# Patient Record
Sex: MALE | Race: WHITE | Employment: FULL TIME
[De-identification: names, ages, dates, MRNs, and addresses within clinical notes are randomized per-mention and may not be internally consistent; named-entity substitution may affect disease eponyms.]

---

## 2020-08-17 ENCOUNTER — NURSE TRIAGE (OUTPATIENT)
Dept: OTHER | Facility: CLINIC | Age: 40
End: 2020-08-17

## 2020-08-20 ENCOUNTER — OFFICE VISIT (OUTPATIENT)
Dept: FAMILY MEDICINE CLINIC | Age: 40
End: 2020-08-20
Payer: COMMERCIAL

## 2020-08-20 VITALS
RESPIRATION RATE: 28 BRPM | BODY MASS INDEX: 31.83 KG/M2 | WEIGHT: 248 LBS | DIASTOLIC BLOOD PRESSURE: 68 MMHG | SYSTOLIC BLOOD PRESSURE: 122 MMHG | OXYGEN SATURATION: 85 % | TEMPERATURE: 97.1 F | HEART RATE: 88 BPM | HEIGHT: 74 IN

## 2020-08-20 PROCEDURE — G8417 CALC BMI ABV UP PARAM F/U: HCPCS | Performed by: NURSE PRACTITIONER

## 2020-08-20 PROCEDURE — 99204 OFFICE O/P NEW MOD 45 MIN: CPT | Performed by: NURSE PRACTITIONER

## 2020-08-20 PROCEDURE — G8427 DOCREV CUR MEDS BY ELIG CLIN: HCPCS | Performed by: NURSE PRACTITIONER

## 2020-08-20 PROCEDURE — 1036F TOBACCO NON-USER: CPT | Performed by: NURSE PRACTITIONER

## 2020-08-20 RX ORDER — BENZONATATE 200 MG/1
200 CAPSULE ORAL 3 TIMES DAILY PRN
Qty: 30 CAPSULE | Refills: 0 | Status: SHIPPED | OUTPATIENT
Start: 2020-08-20 | End: 2020-08-27

## 2020-08-20 RX ORDER — CETIRIZINE HYDROCHLORIDE 10 MG/1
10 TABLET ORAL DAILY
COMMUNITY

## 2020-08-20 RX ORDER — SERTRALINE HYDROCHLORIDE 25 MG/1
25 TABLET, FILM COATED ORAL DAILY
Status: ON HOLD | COMMUNITY
End: 2020-09-13 | Stop reason: HOSPADM

## 2020-08-20 RX ORDER — ATORVASTATIN CALCIUM 10 MG/1
10 TABLET, FILM COATED ORAL DAILY
COMMUNITY
End: 2020-09-22 | Stop reason: SDUPTHER

## 2020-08-20 RX ORDER — QUETIAPINE FUMARATE 50 MG/1
50 TABLET, FILM COATED ORAL NIGHTLY
COMMUNITY
End: 2022-03-09

## 2020-08-20 RX ORDER — MELATONIN 10 MG
2 CAPSULE ORAL NIGHTLY
COMMUNITY

## 2020-08-20 RX ORDER — PREDNISONE 1 MG/1
TABLET ORAL
Qty: 45 TABLET | Refills: 0 | Status: SHIPPED | OUTPATIENT
Start: 2020-08-20 | End: 2020-08-30

## 2020-08-20 RX ORDER — AZITHROMYCIN 250 MG/1
250 TABLET, FILM COATED ORAL DAILY
COMMUNITY
End: 2020-09-14 | Stop reason: SDUPTHER

## 2020-08-20 RX ORDER — OMEPRAZOLE 40 MG/1
40 CAPSULE, DELAYED RELEASE ORAL DAILY
COMMUNITY
End: 2021-01-08 | Stop reason: SDUPTHER

## 2020-08-20 RX ORDER — ALBUTEROL SULFATE 2.5 MG/3ML
2.5 SOLUTION RESPIRATORY (INHALATION) EVERY 6 HOURS PRN
COMMUNITY
End: 2020-09-14 | Stop reason: SDUPTHER

## 2020-08-20 SDOH — ECONOMIC STABILITY: INCOME INSECURITY: HOW HARD IS IT FOR YOU TO PAY FOR THE VERY BASICS LIKE FOOD, HOUSING, MEDICAL CARE, AND HEATING?: NOT VERY HARD

## 2020-08-20 SDOH — ECONOMIC STABILITY: FOOD INSECURITY: WITHIN THE PAST 12 MONTHS, YOU WORRIED THAT YOUR FOOD WOULD RUN OUT BEFORE YOU GOT MONEY TO BUY MORE.: NEVER TRUE

## 2020-08-20 SDOH — ECONOMIC STABILITY: FOOD INSECURITY: WITHIN THE PAST 12 MONTHS, THE FOOD YOU BOUGHT JUST DIDN'T LAST AND YOU DIDN'T HAVE MONEY TO GET MORE.: NEVER TRUE

## 2020-08-20 ASSESSMENT — ENCOUNTER SYMPTOMS
EYES NEGATIVE: 1
COUGH: 1
SHORTNESS OF BREATH: 1
GASTROINTESTINAL NEGATIVE: 1

## 2020-08-20 ASSESSMENT — PATIENT HEALTH QUESTIONNAIRE - PHQ9
SUM OF ALL RESPONSES TO PHQ QUESTIONS 1-9: 0
SUM OF ALL RESPONSES TO PHQ9 QUESTIONS 1 & 2: 0
2. FEELING DOWN, DEPRESSED OR HOPELESS: 0
SUM OF ALL RESPONSES TO PHQ QUESTIONS 1-9: 0
1. LITTLE INTEREST OR PLEASURE IN DOING THINGS: 0

## 2020-08-20 NOTE — PROGRESS NOTES
Derrick Hernandez is a 38 y.o. male whopresents today for :  Chief Complaint   Patient presents with    New Patient    Breathing Problem    Cough     congested cough x 3 weeks    Sinus Problem       HPI:     HPI  Patient presents as a new patient. Chief complaint is difficulty breathing. History is is that 4 years ago he began having severe sinus problems and at times severe shortness of breath. He has had multiple hospital admissions but no conclusive findings. At one point he was diagnosed with vocal cord dysfunction however treatment of that provided no relief. He will become so short of breath that he has to just lay in bed until he recovers. He spells will typically last for weeks at a time. He did have multiple sinus surgeries at the beginning and reports his sinuses are better. He had a flare in April that required hospitalization. Since he has been short of breath all summer with a recent flare beginning a few weeks ago. Upon entering the room the patient was in obvious distress with a pulse ox of 85 and difficulty even talking. He was placed on 4 L of oxygen and his pulse ox laure to 92%. He felt much better following oxygen. He reports when these flares occur he brings up thick mucus at times. When his sinuses were flared up he reports having excessive sinus mucus. I was able to review records from [place] as previous admissions which revealed revealed prior granulomatous disease. He is on a [place] at this time but denies fever. He has had no recent COVID exposure. The symptoms are consistent with the spells he has had over the past 4 years. There is no problem list on file for this patient.        Past Medical History:   Diagnosis Date    Anxiety     Chronic rhinosinusitis     Depression     Hypercholesteremia     Vocal cord dysfunction       Past Surgical History:   Procedure Laterality Date    SINUS SURGERY  12/01/2016     Family History   Problem Relation Age of Onset    No Review of Systems   Constitutional: Positive for fatigue. HENT: Negative. Eyes: Negative. Respiratory: Positive for cough and shortness of breath. Cardiovascular: Negative. Gastrointestinal: Negative. Musculoskeletal: Negative. Skin: Negative. Neurological: Negative. Objective:     Vitals:    08/20/20 1551   BP: 122/68   Site: Right Upper Arm   Position: Sitting   Cuff Size: Large Adult   Pulse: 88   Resp: 28   Temp: 97.1 °F (36.2 °C)   TempSrc: Temporal   SpO2: (!) 85%   Weight: 248 lb (112.5 kg)   Height: 6' 1.5\" (1.867 m)       Physical Exam  Constitutional:       General: He is in acute distress. Appearance: He is well-developed. HENT:      Head: Normocephalic. Right Ear: Tympanic membrane and external ear normal.      Left Ear: Tympanic membrane and external ear normal.      Nose: Nose normal.   Neck:      Musculoskeletal: Normal range of motion and neck supple. Cardiovascular:      Rate and Rhythm: Normal rate and regular rhythm. Heart sounds: Normal heart sounds. No murmur. No friction rub. No gallop. Pulmonary:      Effort: Tachypnea, prolonged expiration and retractions present. Breath sounds: Transmitted upper airway sounds (audible upper airway wheezing) present. No wheezing or rales. Abdominal:      General: Bowel sounds are normal.      Palpations: Abdomen is soft. Tenderness: There is no abdominal tenderness. There is no guarding. Musculoskeletal: Normal range of motion. Lymphadenopathy:      Cervical: No cervical adenopathy. Skin:     General: Skin is warm. Neurological:      Mental Status: He is alert and oriented to person, place, and time. Deep Tendon Reflexes: Reflexes are normal and symmetric. Assessment:      Diagnosis Orders   1.  Bronchial stenosis  predniSONE (DELTASONE) 5 MG tablet    benzonatate (TESSALON) 200 MG capsule    CT CHEST WO CONTRAST    External Referral To Pulmonology       Plan:      No follow-ups on file. Orders Placed This Encounter   Procedures    CT CHEST WO CONTRAST     Standing Status:   Future     Standing Expiration Date:   2021    External Referral To Pulmonology     Referral Priority:   Routine     Referral Type:   Eval and Treat     Referral Reason:   Specialty Services Required     Requested Specialty:   Pulmonology     Number of Visits Requested:   1     Orders Placed This Encounter   Medications    predniSONE (DELTASONE) 5 MG tablet     Si tabs po day 1 taper by 5mg daily     Dispense:  45 tablet     Refill:  0    benzonatate (TESSALON) 200 MG capsule     Sig: Take 1 capsule by mouth 3 times daily as needed for Cough     Dispense:  30 capsule     Refill:  0      Very interesting patient. First will stabilize patient and order O2. Pt was 85% on room air 92% on 4 liters. Will start steroid burst and cont zpak  Will refer to [facility] as I suspect pt has wegeners granulomatosis. Will get ct of chest  Will make sure o2 delivered this evening     Patient given educational materials - seepatient instructions. Discussed use, benefit, and side effects of prescribed medications. All patient questions answered. Pt voiced understanding. Patient agreed withtreatment plan. Follow up as directed.      Electronically signed by JOHN España CNP on 2020 at 4:45 PM

## 2020-08-21 ENCOUNTER — TELEPHONE (OUTPATIENT)
Dept: FAMILY MEDICINE CLINIC | Age: 40
End: 2020-08-21

## 2020-08-21 NOTE — TELEPHONE ENCOUNTER
Myles Murphy from [facility] called stating pt refused the oxygen they wanted to bring to him last night. Myles Murphy thought it was maybe due to insurance purposes. She said she was going to call him again today to check in on him to see how he is doing and to see if she could convince him again for the oxygen.    Please advise

## 2020-08-21 NOTE — TELEPHONE ENCOUNTER
Ok, I called and spoke to pt. He was told since he is on an antibiotic the insurance will not cover.   He is going to see how he does without it

## 2020-08-31 ENCOUNTER — TELEPHONE (OUTPATIENT)
Dept: FAMILY MEDICINE CLINIC | Age: 40
End: 2020-08-31

## 2020-08-31 NOTE — TELEPHONE ENCOUNTER
Patients wife called asking about referral to pulmonology at [address] [place] [address]. Per CC patient has to have the CT done prior to being scheduled there. Advised patients wife, will work on Oleg Rothman.

## 2020-09-04 NOTE — TELEPHONE ENCOUNTER
Left detailed message for patient stating he is scheduled at [facility] on 9/8/20 arrival of 0745.

## 2020-09-08 ENCOUNTER — HOSPITAL ENCOUNTER (OUTPATIENT)
Dept: CT IMAGING | Age: 40
Discharge: HOME | DRG: 208 | End: 2020-09-08
Payer: COMMERCIAL

## 2020-09-08 PROCEDURE — 71250 CT THORAX DX C-: CPT

## 2020-09-10 ENCOUNTER — HOSPITAL ENCOUNTER (INPATIENT)
Age: 40
LOS: 2 days | Discharge: HOME | DRG: 208 | End: 2020-09-13
Attending: EMERGENCY MEDICINE | Admitting: INTERNAL MEDICINE
Payer: COMMERCIAL

## 2020-09-10 ENCOUNTER — APPOINTMENT (OUTPATIENT)
Dept: GENERAL RADIOLOGY | Age: 40
DRG: 208 | End: 2020-09-10
Payer: COMMERCIAL

## 2020-09-10 LAB
APTT: 33.1 SECONDS (ref 22–38)
BASE EXCESS (CALCULATED): -4 MMOL/L (ref -2.5–2.5)
COLLECTED BY:: ABNORMAL
EKG ATRIAL RATE: 106 BPM
EKG P AXIS: 67 DEGREES
EKG P-R INTERVAL: 140 MS
EKG Q-T INTERVAL: 398 MS
EKG QRS DURATION: 84 MS
EKG QTC CALCULATION (BAZETT): 528 MS
EKG R AXIS: 13 DEGREES
EKG T AXIS: 57 DEGREES
EKG VENTRICULAR RATE: 106 BPM
HCO3: 34 MMOL/L (ref 23–28)
INR BLD: 0.97 (ref 0.85–1.13)
O2 SATURATION: 97 %
PCO2: 138 MMHG (ref 35–45)
PH BLOOD GAS: 6.99 (ref 7.35–7.45)
PO2: 153 MMHG (ref 71–104)

## 2020-09-10 PROCEDURE — 94660 CPAP INITIATION&MGMT: CPT

## 2020-09-10 PROCEDURE — 82803 BLOOD GASES ANY COMBINATION: CPT

## 2020-09-10 PROCEDURE — 94644 CONT INHLJ TX 1ST HOUR: CPT

## 2020-09-10 PROCEDURE — 6360000002 HC RX W HCPCS: Performed by: EMERGENCY MEDICINE

## 2020-09-10 PROCEDURE — 36600 WITHDRAWAL OF ARTERIAL BLOOD: CPT

## 2020-09-10 PROCEDURE — 2700000000 HC OXYGEN THERAPY PER DAY

## 2020-09-10 PROCEDURE — 80053 COMPREHEN METABOLIC PANEL: CPT

## 2020-09-10 PROCEDURE — 83735 ASSAY OF MAGNESIUM: CPT

## 2020-09-10 PROCEDURE — 83880 ASSAY OF NATRIURETIC PEPTIDE: CPT

## 2020-09-10 PROCEDURE — 0BH18EZ INSERTION OF ENDOTRACHEAL AIRWAY INTO TRACHEA, VIA NATURAL OR ARTIFICIAL OPENING ENDOSCOPIC: ICD-10-PCS | Performed by: EMERGENCY MEDICINE

## 2020-09-10 PROCEDURE — 84484 ASSAY OF TROPONIN QUANT: CPT

## 2020-09-10 PROCEDURE — 85025 COMPLETE CBC W/AUTO DIFF WBC: CPT

## 2020-09-10 PROCEDURE — 36556 INSERT NON-TUNNEL CV CATH: CPT

## 2020-09-10 PROCEDURE — 36415 COLL VENOUS BLD VENIPUNCTURE: CPT

## 2020-09-10 PROCEDURE — 84145 PROCALCITONIN (PCT): CPT

## 2020-09-10 PROCEDURE — 85610 PROTHROMBIN TIME: CPT

## 2020-09-10 PROCEDURE — 02HV33Z INSERTION OF INFUSION DEVICE INTO SUPERIOR VENA CAVA, PERCUTANEOUS APPROACH: ICD-10-PCS | Performed by: EMERGENCY MEDICINE

## 2020-09-10 PROCEDURE — 85730 THROMBOPLASTIN TIME PARTIAL: CPT

## 2020-09-10 PROCEDURE — 71045 X-RAY EXAM CHEST 1 VIEW: CPT

## 2020-09-10 PROCEDURE — 94761 N-INVAS EAR/PLS OXIMETRY MLT: CPT

## 2020-09-10 PROCEDURE — 99285 EMERGENCY DEPT VISIT HI MDM: CPT

## 2020-09-10 PROCEDURE — 93005 ELECTROCARDIOGRAM TRACING: CPT | Performed by: EMERGENCY MEDICINE

## 2020-09-10 PROCEDURE — 5A1935Z RESPIRATORY VENTILATION, LESS THAN 24 CONSECUTIVE HOURS: ICD-10-PCS | Performed by: EMERGENCY MEDICINE

## 2020-09-10 RX ADMIN — ALBUTEROL SULFATE 0.5 MG/KG/HR: 2.5 SOLUTION RESPIRATORY (INHALATION) at 23:46

## 2020-09-11 ENCOUNTER — TELEPHONE (OUTPATIENT)
Dept: FAMILY MEDICINE CLINIC | Age: 40
End: 2020-09-11

## 2020-09-11 ENCOUNTER — APPOINTMENT (OUTPATIENT)
Dept: GENERAL RADIOLOGY | Age: 40
DRG: 208 | End: 2020-09-11
Payer: COMMERCIAL

## 2020-09-11 PROBLEM — J96.90 RESPIRATORY FAILURE (HCC): Status: ACTIVE | Noted: 2020-09-11

## 2020-09-11 LAB
ACINETOBACTER CALCOACETICUS-BAUMANNII BY PCR: NOT DETECTED
ADENOVIRUS BY PCR: NOT DETECTED
ALBUMIN SERPL-MCNC: 4.4 G/DL (ref 3.5–5.1)
ALLEN TEST: POSITIVE
ALP BLD-CCNC: 98 U/L (ref 38–126)
ALT SERPL-CCNC: 18 U/L (ref 11–66)
AMPHETAMINE+METHAMPHETAMINE URINE SCREEN: NEGATIVE
ANION GAP SERPL CALCULATED.3IONS-SCNC: 10 MEQ/L (ref 8–16)
ANION GAP SERPL CALCULATED.3IONS-SCNC: 14 MEQ/L (ref 8–16)
AST SERPL-CCNC: 22 U/L (ref 5–40)
ATYPICAL LYMPHOCYTES: ABNORMAL %
BACTERIA: ABNORMAL /HPF
BAL CHARACTER: ABNORMAL
BAL COLLECTION SITE: ABNORMAL
BAL COLOR: COLORLESS
BARBITURATE QUANTITATIVE URINE: NEGATIVE
BASE EXCESS (CALCULATED): -0.7 MMOL/L (ref -2.5–2.5)
BASOPHILS # BLD: 0.2 %
BASOPHILS # BLD: 0.8 %
BASOPHILS ABSOLUTE: 0 THOU/MM3 (ref 0–0.1)
BASOPHILS ABSOLUTE: 0.1 THOU/MM3 (ref 0–0.1)
BENZODIAZEPINE QUANTITATIVE URINE: POSITIVE
BILIRUB SERPL-MCNC: 0.4 MG/DL (ref 0.3–1.2)
BILIRUBIN URINE: NEGATIVE
BLOOD, URINE: NEGATIVE
BUN BLDV-MCNC: 18 MG/DL (ref 7–22)
BUN BLDV-MCNC: 19 MG/DL (ref 7–22)
C-REACTIVE PROTEIN: 1.6 MG/DL (ref 0–1)
CALCIUM SERPL-MCNC: 8.4 MG/DL (ref 8.5–10.5)
CALCIUM SERPL-MCNC: 9.5 MG/DL (ref 8.5–10.5)
CANNABINOID QUANTITATIVE URINE: POSITIVE
CASTS 2: ABNORMAL /LPF
CASTS UA: ABNORMAL /LPF
CHARACTER, URINE: CLEAR
CHLAMYDIA PNEUMONIAE BY PCR: NOT DETECTED
CHLORIDE BLD-SCNC: 101 MEQ/L (ref 98–111)
CHLORIDE BLD-SCNC: 106 MEQ/L (ref 98–111)
CO2: 25 MEQ/L (ref 23–33)
CO2: 25 MEQ/L (ref 23–33)
COCAINE METABOLITE QUANTITATIVE URINE: NEGATIVE
COLLECTED BY:: ABNORMAL
COLOR: YELLOW
CORONAVIRUS PCR: NOT DETECTED
CREAT SERPL-MCNC: 1.3 MG/DL (ref 0.4–1.2)
CREAT SERPL-MCNC: 1.3 MG/DL (ref 0.4–1.2)
CREAT SERPL-MCNC: 1.4 MG/DL (ref 0.4–1.2)
CRYSTALS, UA: ABNORMAL
DEVICE: ABNORMAL
ENTEROBACTER CLOACAE COMPLEX BY PCR: NOT DETECTED
EOSINOPHIL # BLD: 0.3 %
EOSINOPHIL # BLD: 8 %
EOSINOPHILS ABSOLUTE: 0 THOU/MM3 (ref 0–0.4)
EOSINOPHILS ABSOLUTE: 1.3 THOU/MM3 (ref 0–0.4)
EOSINOPHILS BAL: 17 % (ref 0–1)
EPITHELIAL CELLS, UA: ABNORMAL /HPF
ERYTHROCYTE [DISTWIDTH] IN BLOOD BY AUTOMATED COUNT: 14 % (ref 11.5–14.5)
ERYTHROCYTE [DISTWIDTH] IN BLOOD BY AUTOMATED COUNT: 14.7 % (ref 11.5–14.5)
ERYTHROCYTE [DISTWIDTH] IN BLOOD BY AUTOMATED COUNT: 40.3 FL (ref 35–45)
ERYTHROCYTE [DISTWIDTH] IN BLOOD BY AUTOMATED COUNT: 41.9 FL (ref 35–45)
ESCHERICHIA COLI BY PCR: NOT DETECTED
GFR SERPL CREATININE-BSD FRML MDRD: 56 ML/MIN/1.73M2
GFR SERPL CREATININE-BSD FRML MDRD: 61 ML/MIN/1.73M2
GFR SERPL CREATININE-BSD FRML MDRD: 61 ML/MIN/1.73M2
GLUCOSE BLD-MCNC: 116 MG/DL (ref 70–108)
GLUCOSE BLD-MCNC: 131 MG/DL (ref 70–108)
GLUCOSE BLD-MCNC: 135 MG/DL (ref 70–108)
GLUCOSE BLD-MCNC: 152 MG/DL (ref 70–108)
GLUCOSE BLD-MCNC: 159 MG/DL (ref 70–108)
GLUCOSE BLD-MCNC: 189 MG/DL (ref 70–108)
GLUCOSE BLD-MCNC: 219 MG/DL (ref 70–108)
GLUCOSE URINE: NEGATIVE MG/DL
HAEMOPHILUS INFLUENZAE BY PCR: NOT DETECTED
HCO3: 28 MMOL/L (ref 23–28)
HCT VFR BLD CALC: 45.9 % (ref 42–52)
HCT VFR BLD CALC: 53.3 % (ref 42–52)
HEMOGLOBIN: 14.4 GM/DL (ref 14–18)
HEMOGLOBIN: 16.7 GM/DL (ref 14–18)
IFIO2: 50
IGA: 146 MG/DL (ref 70–400)
IGE: 21 IU/ML
IGG: 947 MG/DL (ref 700–1600)
IGM: 93 MG/DL (ref 40–230)
IMMATURE GRANS (ABS): 0.1 THOU/MM3 (ref 0–0.07)
IMMATURE GRANS (ABS): 0.16 THOU/MM3 (ref 0–0.07)
IMMATURE GRANULOCYTES: 0.6 %
IMMATURE GRANULOCYTES: 1.3 %
INFLUENZA A BY PCR: NOT DETECTED
INFLUENZA B BY PCR: NOT DETECTED
KETONES, URINE: NEGATIVE
KLEBSIELLA AEROGENES BY PCR: NOT DETECTED
KLEBSIELLA OXYTOCA BY PCR: NOT DETECTED
KLEBSIELLA PNEUMONIAE GROUP BY PCR: NOT DETECTED
LACTIC ACID: 1 MMOL/L (ref 0.5–2.2)
LEGIONELLA PNEUMOPHILIA BY PCR: NOT DETECTED
LEUKOCYTE ESTERASE, URINE: NEGATIVE
LYMPHOCYTES # BLD: 3.4 %
LYMPHOCYTES # BLD: 33.7 %
LYMPHOCYTES ABSOLUTE: 0.4 THOU/MM3 (ref 1–4.8)
LYMPHOCYTES ABSOLUTE: 5.7 THOU/MM3 (ref 1–4.8)
LYMPHOCYTES, BAL: 15 % (ref 10–15)
MACROPHAGE/MONOCYTE BAL: 2 % (ref 86–100)
MAGNESIUM: 2.2 MG/DL (ref 1.6–2.4)
MAGNESIUM: 2.2 MG/DL (ref 1.6–2.4)
MCH RBC QN AUTO: 25.9 PG (ref 26–33)
MCH RBC QN AUTO: 26.3 PG (ref 26–33)
MCHC RBC AUTO-ENTMCNC: 31.3 GM/DL (ref 32.2–35.5)
MCHC RBC AUTO-ENTMCNC: 31.4 GM/DL (ref 32.2–35.5)
MCV RBC AUTO: 82.7 FL (ref 80–94)
MCV RBC AUTO: 84.1 FL (ref 80–94)
METAPNEUMOVIRUS BY PCR: NOT DETECTED
MISCELLANEOUS 2: ABNORMAL
MODE: ABNORMAL
MONOCYTES # BLD: 1.7 %
MONOCYTES # BLD: 6 %
MONOCYTES ABSOLUTE: 0.2 THOU/MM3 (ref 0.4–1.3)
MONOCYTES ABSOLUTE: 1 THOU/MM3 (ref 0.4–1.3)
MORAXELLA CATARRHALIS BY PCR: NOT DETECTED
MRSA SCREEN RT-PCR: NEGATIVE
MYCOPLASMA PNEUMONIAE BY PCR: NOT DETECTED
NITRITE, URINE: NEGATIVE
NUCLEATED RED BLOOD CELLS: 0 /100 WBC
NUCLEATED RED BLOOD CELLS: 0 /100 WBC
O2 SATURATION: 88 %
OPIATES, URINE: NEGATIVE
OSMOLALITY CALCULATION: 288 MOSMOL/KG (ref 275–300)
OXYCODONE: NEGATIVE
PARAINFLUENZA VIRUS BY PCR: NOT DETECTED
PATHOLOGIST REVIEW: ABNORMAL
PCO2: 59 MMHG (ref 35–45)
PH BLOOD GAS: 7.28 (ref 7.35–7.45)
PH UA: 5 (ref 5–9)
PHENCYCLIDINE QUANTITATIVE URINE: NEGATIVE
PIP: 35 CMH2O
PLATELET # BLD: 174 THOU/MM3 (ref 130–400)
PLATELET # BLD: 273 THOU/MM3 (ref 130–400)
PLATELET ESTIMATE: ADEQUATE
PMV BLD AUTO: 11.8 FL (ref 9.4–12.4)
PMV BLD AUTO: 11.9 FL (ref 9.4–12.4)
PO2: 63 MMHG (ref 71–104)
POTASSIUM REFLEX MAGNESIUM: 4.1 MEQ/L (ref 3.5–5.2)
POTASSIUM SERPL-SCNC: 4.2 MEQ/L (ref 3.5–5.2)
PRO-BNP: 88.4 PG/ML (ref 0–450)
PROCALCITONIN: 0.04 NG/ML (ref 0.01–0.09)
PROTEIN UA: 30
PROTEUS SPECIES BY PCR: NOT DETECTED
PSEUDOMONAS AERUGINOSA BY PCR: NOT DETECTED
RBC # BLD: 5.55 MILL/MM3 (ref 4.7–6.1)
RBC # BLD: 6.34 MILL/MM3 (ref 4.7–6.1)
RBC BAL: 43 /CUMM
RBC URINE: ABNORMAL /HPF
RENAL EPITHELIAL, UA: ABNORMAL
RESISTANT GENE CTX-M BY PCR: ABNORMAL
RESISTANT GENE IMP BY PCR: ABNORMAL
RESISTANT GENE KPC BY PCR: ABNORMAL
RESISTANT GENE MECA/C & MREJ BY PCR: NOT DETECTED
RESISTANT GENE NDM BY PCR: ABNORMAL
RESISTANT GENE OXA-48-LIKE BY PCR: ABNORMAL
RESISTANT GENE VIM BY PCR: ABNORMAL
RESPIRATORY SYNCYTIAL VIRUS BY PCR: NOT DETECTED
RHINOVIRUS ENTEROVIRUS PCR: NOT DETECTED
SARS-COV-2, NAAT: NOT DETECTED
SCAN OF BLOOD SMEAR: NORMAL
SEDIMENTATION RATE, ERYTHROCYTE: 8 MM/HR (ref 0–10)
SEG NEUTROPHILS: 50.9 %
SEG NEUTROPHILS: 93.1 %
SEGMENTED NEUTROPHILS ABSOLUTE COUNT: 11.5 THOU/MM3 (ref 1.8–7.7)
SEGMENTED NEUTROPHILS ABSOLUTE COUNT: 8.6 THOU/MM3 (ref 1.8–7.7)
SEGMENTED NEUTROPHILS, BAL: 66 % (ref 0–3)
SERRATIA MARCESCENS BY PCR: NOT DETECTED
SET PEEP: 6 MMHG
SET RESPIRATORY RATE: 20 BPM
SODIUM BLD-SCNC: 140 MEQ/L (ref 135–145)
SODIUM BLD-SCNC: 141 MEQ/L (ref 135–145)
SOURCE, BLOOD GAS: ABNORMAL
SOURCE: ABNORMAL
SPECIFIC GRAVITY, URINE: 1.02 (ref 1–1.03)
SPECIMEN ACCEPTABILITY: ABNORMAL
STAPH AUREUS BY PCR: DETECTED
STREP AGALACTIAE BY PCR: NOT DETECTED
STREP PNEUMONIAE BY PCR: NOT DETECTED
STREP PYOGENES BY PCR: NOT DETECTED
TOTAL NUCLEATED CELLS BAL: 653 /CUMM
TOTAL PROTEIN: 7.5 G/DL (ref 6.1–8)
TOTAL VOLUME RECEIVED BAL: 3 ML
TROPONIN T: < 0.01 NG/ML
TSH SERPL DL<=0.05 MIU/L-ACNC: 0.59 UIU/ML (ref 0.4–4.2)
UROBILINOGEN, URINE: 0.2 EU/DL (ref 0–1)
VANCOMYCIN RESISTANT ENTEROCOCCUS: NEGATIVE
WBC # BLD: 12.3 THOU/MM3 (ref 4.8–10.8)
WBC # BLD: 16.8 THOU/MM3 (ref 4.8–10.8)
WBC UA: ABNORMAL /HPF
YEAST: ABNORMAL

## 2020-09-11 PROCEDURE — APPSS180 APP SPLIT SHARED TIME > 60 MINUTES: Performed by: NURSE PRACTITIONER

## 2020-09-11 PROCEDURE — 71045 X-RAY EXAM CHEST 1 VIEW: CPT

## 2020-09-11 PROCEDURE — 36415 COLL VENOUS BLD VENIPUNCTURE: CPT

## 2020-09-11 PROCEDURE — 2580000003 HC RX 258: Performed by: NURSE PRACTITIONER

## 2020-09-11 PROCEDURE — 6370000000 HC RX 637 (ALT 250 FOR IP): Performed by: NURSE PRACTITIONER

## 2020-09-11 PROCEDURE — 2500000003 HC RX 250 WO HCPCS: Performed by: EMERGENCY MEDICINE

## 2020-09-11 PROCEDURE — 2709999900 HC NON-CHARGEABLE SUPPLY

## 2020-09-11 PROCEDURE — 99291 CRITICAL CARE FIRST HOUR: CPT | Performed by: INTERNAL MEDICINE

## 2020-09-11 PROCEDURE — 2500000003 HC RX 250 WO HCPCS: Performed by: INTERNAL MEDICINE

## 2020-09-11 PROCEDURE — 6360000002 HC RX W HCPCS: Performed by: EMERGENCY MEDICINE

## 2020-09-11 PROCEDURE — 82565 ASSAY OF CREATININE: CPT

## 2020-09-11 PROCEDURE — 31500 INSERT EMERGENCY AIRWAY: CPT

## 2020-09-11 PROCEDURE — 86255 FLUORESCENT ANTIBODY SCREEN: CPT

## 2020-09-11 PROCEDURE — 87077 CULTURE AEROBIC IDENTIFY: CPT

## 2020-09-11 PROCEDURE — 86038 ANTINUCLEAR ANTIBODIES: CPT

## 2020-09-11 PROCEDURE — 87631 RESP VIRUS 3-5 TARGETS: CPT

## 2020-09-11 PROCEDURE — 87205 SMEAR GRAM STAIN: CPT

## 2020-09-11 PROCEDURE — 83605 ASSAY OF LACTIC ACID: CPT

## 2020-09-11 PROCEDURE — 86140 C-REACTIVE PROTEIN: CPT

## 2020-09-11 PROCEDURE — 94640 AIRWAY INHALATION TREATMENT: CPT

## 2020-09-11 PROCEDURE — 94002 VENT MGMT INPAT INIT DAY: CPT

## 2020-09-11 PROCEDURE — 82784 ASSAY IGA/IGD/IGG/IGM EACH: CPT

## 2020-09-11 PROCEDURE — 82785 ASSAY OF IGE: CPT

## 2020-09-11 PROCEDURE — 87070 CULTURE OTHR SPECIMN AEROBIC: CPT

## 2020-09-11 PROCEDURE — 87486 CHLMYD PNEUM DNA AMP PROBE: CPT

## 2020-09-11 PROCEDURE — 2500000003 HC RX 250 WO HCPCS: Performed by: NURSE PRACTITIONER

## 2020-09-11 PROCEDURE — 86162 COMPLEMENT TOTAL (CH50): CPT

## 2020-09-11 PROCEDURE — 81001 URINALYSIS AUTO W/SCOPE: CPT

## 2020-09-11 PROCEDURE — 80307 DRUG TEST PRSMV CHEM ANLYZR: CPT

## 2020-09-11 PROCEDURE — 6360000002 HC RX W HCPCS: Performed by: INTERNAL MEDICINE

## 2020-09-11 PROCEDURE — 85651 RBC SED RATE NONAUTOMATED: CPT

## 2020-09-11 PROCEDURE — 87081 CULTURE SCREEN ONLY: CPT

## 2020-09-11 PROCEDURE — 80048 BASIC METABOLIC PNL TOTAL CA: CPT

## 2020-09-11 PROCEDURE — 87541 LEGION PNEUMO DNA AMP PROB: CPT

## 2020-09-11 PROCEDURE — 82803 BLOOD GASES ANY COMBINATION: CPT

## 2020-09-11 PROCEDURE — 6370000000 HC RX 637 (ALT 250 FOR IP): Performed by: INTERNAL MEDICINE

## 2020-09-11 PROCEDURE — 87581 M.PNEUMON DNA AMP PROBE: CPT

## 2020-09-11 PROCEDURE — 2580000003 HC RX 258: Performed by: INTERNAL MEDICINE

## 2020-09-11 PROCEDURE — 87040 BLOOD CULTURE FOR BACTERIA: CPT

## 2020-09-11 PROCEDURE — 89220 SPUTUM SPECIMEN COLLECTION: CPT

## 2020-09-11 PROCEDURE — 87500 VANOMYCIN DNA AMP PROBE: CPT

## 2020-09-11 PROCEDURE — 6360000002 HC RX W HCPCS

## 2020-09-11 PROCEDURE — U0002 COVID-19 LAB TEST NON-CDC: HCPCS

## 2020-09-11 PROCEDURE — 2700000000 HC OXYGEN THERAPY PER DAY

## 2020-09-11 PROCEDURE — 36592 COLLECT BLOOD FROM PICC: CPT

## 2020-09-11 PROCEDURE — 85025 COMPLETE CBC W/AUTO DIFF WBC: CPT

## 2020-09-11 PROCEDURE — 36600 WITHDRAWAL OF ARTERIAL BLOOD: CPT

## 2020-09-11 PROCEDURE — 87150 DNA/RNA AMPLIFIED PROBE: CPT

## 2020-09-11 PROCEDURE — 6360000002 HC RX W HCPCS: Performed by: NURSE PRACTITIONER

## 2020-09-11 PROCEDURE — 94761 N-INVAS EAR/PLS OXIMETRY MLT: CPT

## 2020-09-11 PROCEDURE — 87147 CULTURE TYPE IMMUNOLOGIC: CPT

## 2020-09-11 PROCEDURE — 93010 ELECTROCARDIOGRAM REPORT: CPT | Performed by: NUCLEAR MEDICINE

## 2020-09-11 PROCEDURE — 84443 ASSAY THYROID STIM HORMONE: CPT

## 2020-09-11 PROCEDURE — 87641 MR-STAPH DNA AMP PROBE: CPT

## 2020-09-11 PROCEDURE — 87086 URINE CULTURE/COLONY COUNT: CPT

## 2020-09-11 PROCEDURE — 2580000003 HC RX 258: Performed by: EMERGENCY MEDICINE

## 2020-09-11 PROCEDURE — 83735 ASSAY OF MAGNESIUM: CPT

## 2020-09-11 PROCEDURE — 82948 REAGENT STRIP/BLOOD GLUCOSE: CPT

## 2020-09-11 PROCEDURE — 89051 BODY FLUID CELL COUNT: CPT

## 2020-09-11 PROCEDURE — 94010 BREATHING CAPACITY TEST: CPT

## 2020-09-11 PROCEDURE — 86160 COMPLEMENT ANTIGEN: CPT

## 2020-09-11 PROCEDURE — 87798 DETECT AGENT NOS DNA AMP: CPT

## 2020-09-11 PROCEDURE — 2060000000 HC ICU INTERMEDIATE R&B

## 2020-09-11 PROCEDURE — 87186 SC STD MICRODIL/AGAR DIL: CPT

## 2020-09-11 RX ORDER — PROPOFOL 10 MG/ML
10 INJECTION, EMULSION INTRAVENOUS
Status: DISCONTINUED | OUTPATIENT
Start: 2020-09-11 | End: 2020-09-11

## 2020-09-11 RX ORDER — DEXTROSE MONOHYDRATE 25 G/50ML
12.5 INJECTION, SOLUTION INTRAVENOUS PRN
Status: DISCONTINUED | OUTPATIENT
Start: 2020-09-11 | End: 2020-09-12

## 2020-09-11 RX ORDER — NICOTINE POLACRILEX 4 MG
15 LOZENGE BUCCAL PRN
Status: DISCONTINUED | OUTPATIENT
Start: 2020-09-11 | End: 2020-09-12

## 2020-09-11 RX ORDER — ONDANSETRON 2 MG/ML
4 INJECTION INTRAMUSCULAR; INTRAVENOUS EVERY 6 HOURS PRN
Status: DISCONTINUED | OUTPATIENT
Start: 2020-09-11 | End: 2020-09-13 | Stop reason: HOSPADM

## 2020-09-11 RX ORDER — SODIUM CHLORIDE 0.9 % (FLUSH) 0.9 %
10 SYRINGE (ML) INJECTION EVERY 12 HOURS SCHEDULED
Status: DISCONTINUED | OUTPATIENT
Start: 2020-09-11 | End: 2020-09-13 | Stop reason: HOSPADM

## 2020-09-11 RX ORDER — ATORVASTATIN CALCIUM 10 MG/1
10 TABLET, FILM COATED ORAL DAILY
Status: DISCONTINUED | OUTPATIENT
Start: 2020-09-11 | End: 2020-09-13 | Stop reason: HOSPADM

## 2020-09-11 RX ORDER — DEXAMETHASONE SODIUM PHOSPHATE 4 MG/ML
6 INJECTION, SOLUTION INTRA-ARTICULAR; INTRALESIONAL; INTRAMUSCULAR; INTRAVENOUS; SOFT TISSUE DAILY
Status: DISCONTINUED | OUTPATIENT
Start: 2020-09-11 | End: 2020-09-12

## 2020-09-11 RX ORDER — DEXTROSE MONOHYDRATE 50 MG/ML
100 INJECTION, SOLUTION INTRAVENOUS PRN
Status: DISCONTINUED | OUTPATIENT
Start: 2020-09-11 | End: 2020-09-12

## 2020-09-11 RX ORDER — ACETAMINOPHEN 650 MG/1
650 SUPPOSITORY RECTAL EVERY 6 HOURS PRN
Status: DISCONTINUED | OUTPATIENT
Start: 2020-09-11 | End: 2020-09-13 | Stop reason: HOSPADM

## 2020-09-11 RX ORDER — METHYLPREDNISOLONE SODIUM SUCCINATE 125 MG/2ML
125 INJECTION, POWDER, LYOPHILIZED, FOR SOLUTION INTRAMUSCULAR; INTRAVENOUS ONCE
Status: COMPLETED | OUTPATIENT
Start: 2020-09-11 | End: 2020-09-11

## 2020-09-11 RX ORDER — PROPOFOL 10 MG/ML
10 INJECTION, EMULSION INTRAVENOUS ONCE
Status: COMPLETED | OUTPATIENT
Start: 2020-09-11 | End: 2020-09-11

## 2020-09-11 RX ORDER — KETAMINE HCL IN NACL, ISO-OSM 100MG/10ML
SYRINGE (ML) INJECTION
Status: DISCONTINUED
Start: 2020-09-11 | End: 2020-09-11

## 2020-09-11 RX ORDER — PROPOFOL 10 MG/ML
INJECTION, EMULSION INTRAVENOUS
Status: DISCONTINUED
Start: 2020-09-11 | End: 2020-09-11

## 2020-09-11 RX ORDER — DEXAMETHASONE SODIUM PHOSPHATE 4 MG/ML
4 INJECTION, SOLUTION INTRA-ARTICULAR; INTRALESIONAL; INTRAMUSCULAR; INTRAVENOUS; SOFT TISSUE DAILY
Status: DISCONTINUED | OUTPATIENT
Start: 2020-09-11 | End: 2020-09-11

## 2020-09-11 RX ORDER — VECURONIUM BROMIDE 1 MG/ML
INJECTION, POWDER, LYOPHILIZED, FOR SOLUTION INTRAVENOUS DAILY PRN
Status: COMPLETED | OUTPATIENT
Start: 2020-09-11 | End: 2020-09-11

## 2020-09-11 RX ORDER — PROMETHAZINE HYDROCHLORIDE 25 MG/1
12.5 TABLET ORAL EVERY 6 HOURS PRN
Status: DISCONTINUED | OUTPATIENT
Start: 2020-09-11 | End: 2020-09-13 | Stop reason: HOSPADM

## 2020-09-11 RX ORDER — KETAMINE HCL IN NACL, ISO-OSM 100MG/10ML
SYRINGE (ML) INJECTION DAILY PRN
Status: COMPLETED | OUTPATIENT
Start: 2020-09-11 | End: 2020-09-11

## 2020-09-11 RX ORDER — BUDESONIDE AND FORMOTEROL FUMARATE DIHYDRATE 160; 4.5 UG/1; UG/1
2 AEROSOL RESPIRATORY (INHALATION) 2 TIMES DAILY
Status: DISCONTINUED | OUTPATIENT
Start: 2020-09-11 | End: 2020-09-13 | Stop reason: HOSPADM

## 2020-09-11 RX ORDER — QUETIAPINE FUMARATE 25 MG/1
50 TABLET, FILM COATED ORAL NIGHTLY
Status: DISCONTINUED | OUTPATIENT
Start: 2020-09-11 | End: 2020-09-13 | Stop reason: HOSPADM

## 2020-09-11 RX ORDER — CETIRIZINE HYDROCHLORIDE 10 MG/1
10 TABLET ORAL DAILY
Status: DISCONTINUED | OUTPATIENT
Start: 2020-09-11 | End: 2020-09-13 | Stop reason: HOSPADM

## 2020-09-11 RX ORDER — MAGNESIUM SULFATE IN WATER 40 MG/ML
2 INJECTION, SOLUTION INTRAVENOUS ONCE
Status: COMPLETED | OUTPATIENT
Start: 2020-09-11 | End: 2020-09-11

## 2020-09-11 RX ORDER — POLYETHYLENE GLYCOL 3350 17 G/17G
17 POWDER, FOR SOLUTION ORAL DAILY PRN
Status: DISCONTINUED | OUTPATIENT
Start: 2020-09-11 | End: 2020-09-13 | Stop reason: HOSPADM

## 2020-09-11 RX ORDER — FENTANYL CITRATE 50 UG/ML
INJECTION, SOLUTION INTRAMUSCULAR; INTRAVENOUS
Status: COMPLETED
Start: 2020-09-11 | End: 2020-09-11

## 2020-09-11 RX ORDER — ALBUTEROL SULFATE 2.5 MG/3ML
5 SOLUTION RESPIRATORY (INHALATION) EVERY 6 HOURS SCHEDULED
Status: DISCONTINUED | OUTPATIENT
Start: 2020-09-11 | End: 2020-09-11

## 2020-09-11 RX ORDER — ALBUTEROL SULFATE 2.5 MG/3ML
2.5 SOLUTION RESPIRATORY (INHALATION) EVERY 6 HOURS PRN
Status: DISCONTINUED | OUTPATIENT
Start: 2020-09-11 | End: 2020-09-11

## 2020-09-11 RX ORDER — SODIUM CHLORIDE 9 MG/ML
INJECTION, SOLUTION INTRAVENOUS CONTINUOUS
Status: DISCONTINUED | OUTPATIENT
Start: 2020-09-11 | End: 2020-09-11

## 2020-09-11 RX ORDER — ACETAMINOPHEN 325 MG/1
650 TABLET ORAL EVERY 6 HOURS PRN
Status: DISCONTINUED | OUTPATIENT
Start: 2020-09-11 | End: 2020-09-13 | Stop reason: HOSPADM

## 2020-09-11 RX ORDER — SODIUM CHLORIDE 0.9 % (FLUSH) 0.9 %
10 SYRINGE (ML) INJECTION PRN
Status: DISCONTINUED | OUTPATIENT
Start: 2020-09-11 | End: 2020-09-13 | Stop reason: HOSPADM

## 2020-09-11 RX ORDER — ALBUTEROL SULFATE 2.5 MG/3ML
2.5 SOLUTION RESPIRATORY (INHALATION) EVERY 6 HOURS SCHEDULED
Status: DISCONTINUED | OUTPATIENT
Start: 2020-09-11 | End: 2020-09-11

## 2020-09-11 RX ADMIN — VANCOMYCIN HYDROCHLORIDE 1000 MG: 1 INJECTION, POWDER, LYOPHILIZED, FOR SOLUTION INTRAVENOUS at 02:41

## 2020-09-11 RX ADMIN — ACETAMINOPHEN 650 MG: 325 TABLET ORAL at 21:21

## 2020-09-11 RX ADMIN — NAFCILLIN SODIUM 2 G: 2 INJECTION, POWDER, LYOPHILIZED, FOR SOLUTION INTRAMUSCULAR; INTRAVENOUS at 16:30

## 2020-09-11 RX ADMIN — NAFCILLIN SODIUM 1 G: 1 INJECTION, POWDER, LYOPHILIZED, FOR SOLUTION INTRAMUSCULAR; INTRAVENOUS at 06:29

## 2020-09-11 RX ADMIN — Medication 200 MG: at 00:05

## 2020-09-11 RX ADMIN — PIPERACILLIN AND TAZOBACTAM 3.38 G: 3; .375 INJECTION, POWDER, LYOPHILIZED, FOR SOLUTION INTRAVENOUS at 01:26

## 2020-09-11 RX ADMIN — ENOXAPARIN SODIUM 40 MG: 40 INJECTION SUBCUTANEOUS at 08:56

## 2020-09-11 RX ADMIN — DEXAMETHASONE SODIUM PHOSPHATE 6 MG: 4 INJECTION, SOLUTION INTRAMUSCULAR; INTRAVENOUS at 08:57

## 2020-09-11 RX ADMIN — PROPOFOL 10 MCG/KG/MIN: 10 INJECTION, EMULSION INTRAVENOUS at 00:25

## 2020-09-11 RX ADMIN — BUDESONIDE AND FORMOTEROL FUMARATE DIHYDRATE 2 PUFF: 160; 4.5 AEROSOL RESPIRATORY (INHALATION) at 20:15

## 2020-09-11 RX ADMIN — SODIUM CHLORIDE, PRESERVATIVE FREE 10 ML: 5 INJECTION INTRAVENOUS at 08:56

## 2020-09-11 RX ADMIN — SERTRALINE HYDROCHLORIDE 25 MG: 50 TABLET, FILM COATED ORAL at 08:57

## 2020-09-11 RX ADMIN — ALBUTEROL SULFATE 5 MG: 2.5 SOLUTION RESPIRATORY (INHALATION) at 20:16

## 2020-09-11 RX ADMIN — CETIRIZINE HYDROCHLORIDE 10 MG: 10 TABLET ORAL at 08:57

## 2020-09-11 RX ADMIN — NAFCILLIN SODIUM 2 G: 2 INJECTION, POWDER, LYOPHILIZED, FOR SOLUTION INTRAMUSCULAR; INTRAVENOUS at 23:26

## 2020-09-11 RX ADMIN — NAFCILLIN SODIUM 2 G: 2 INJECTION, POWDER, LYOPHILIZED, FOR SOLUTION INTRAMUSCULAR; INTRAVENOUS at 11:39

## 2020-09-11 RX ADMIN — METHYLPREDNISOLONE SODIUM SUCCINATE 125 MG: 125 INJECTION, POWDER, FOR SOLUTION INTRAMUSCULAR; INTRAVENOUS at 01:23

## 2020-09-11 RX ADMIN — ATORVASTATIN CALCIUM 10 MG: 10 TABLET, FILM COATED ORAL at 08:57

## 2020-09-11 RX ADMIN — VECURONIUM BROMIDE 10 MG: 10 INJECTION, POWDER, LYOPHILIZED, FOR SOLUTION INTRAVENOUS at 00:07

## 2020-09-11 RX ADMIN — ALBUTEROL SULFATE 5 MG: 2.5 SOLUTION RESPIRATORY (INHALATION) at 06:35

## 2020-09-11 RX ADMIN — FAMOTIDINE 20 MG: 10 INJECTION INTRAVENOUS at 20:17

## 2020-09-11 RX ADMIN — FAMOTIDINE 20 MG: 10 INJECTION INTRAVENOUS at 08:56

## 2020-09-11 RX ADMIN — QUETIAPINE FUMARATE 50 MG: 25 TABLET ORAL at 20:25

## 2020-09-11 RX ADMIN — SODIUM CHLORIDE: 9 INJECTION, SOLUTION INTRAVENOUS at 03:41

## 2020-09-11 RX ADMIN — NAFCILLIN SODIUM 2 G: 2 INJECTION, POWDER, LYOPHILIZED, FOR SOLUTION INTRAMUSCULAR; INTRAVENOUS at 20:09

## 2020-09-11 RX ADMIN — MAGNESIUM SULFATE HEPTAHYDRATE 2 G: 40 INJECTION, SOLUTION INTRAVENOUS at 01:21

## 2020-09-11 RX ADMIN — SODIUM CHLORIDE, PRESERVATIVE FREE 10 ML: 5 INJECTION INTRAVENOUS at 20:21

## 2020-09-11 RX ADMIN — PROPOFOL 30 MCG/KG/MIN: 10 INJECTION, EMULSION INTRAVENOUS at 10:51

## 2020-09-11 RX ADMIN — PROPOFOL 30 MCG/KG/MIN: 10 INJECTION, EMULSION INTRAVENOUS at 06:03

## 2020-09-11 RX ADMIN — FENTANYL CITRATE 100 MCG: 50 INJECTION, SOLUTION INTRAMUSCULAR; INTRAVENOUS at 01:16

## 2020-09-11 RX ADMIN — Medication 25 MCG/HR: at 01:56

## 2020-09-11 ASSESSMENT — PAIN SCALES - GENERAL
PAINLEVEL_OUTOF10: 0
PAINLEVEL_OUTOF10: 1
PAINLEVEL_OUTOF10: 0
PAINLEVEL_OUTOF10: 0

## 2020-09-11 ASSESSMENT — PAIN DESCRIPTION - PROGRESSION: CLINICAL_PROGRESSION: GRADUALLY IMPROVING

## 2020-09-11 ASSESSMENT — PAIN - FUNCTIONAL ASSESSMENT: PAIN_FUNCTIONAL_ASSESSMENT: ACTIVITIES ARE NOT PREVENTED

## 2020-09-11 ASSESSMENT — PULMONARY FUNCTION TESTS
PIF_VALUE: 35
PIF_VALUE: 14
PIF_VALUE: 30
PIF_VALUE: 14

## 2020-09-11 ASSESSMENT — PAIN DESCRIPTION - PAIN TYPE: TYPE: ACUTE PAIN

## 2020-09-11 ASSESSMENT — PAIN DESCRIPTION - LOCATION: LOCATION: HEAD

## 2020-09-11 ASSESSMENT — PAIN DESCRIPTION - ONSET: ONSET: ON-GOING

## 2020-09-11 ASSESSMENT — PAIN DESCRIPTION - DESCRIPTORS: DESCRIPTORS: DULL;ACHING

## 2020-09-11 ASSESSMENT — PAIN DESCRIPTION - ORIENTATION: ORIENTATION: MID

## 2020-09-11 ASSESSMENT — PAIN DESCRIPTION - FREQUENCY: FREQUENCY: CONTINUOUS

## 2020-09-11 NOTE — TELEPHONE ENCOUNTER
Patients mother is calling to make sure the message was received. She said he is still in the ICU. She wanted to make that info got to [address] [place] [address] regarding his current status.    Ant Teran

## 2020-09-11 NOTE — ED NOTES
ETT tube removed by Dr. Mary Kay De Leon. Preparing to reintubate pt at this time.  Oral suctioning at this time     Ryder Rick RN  09/11/20 0012

## 2020-09-11 NOTE — FLOWSHEET NOTE
Pt was alone and in bed at the time of the visit. He was dealing with respiratory failure. He wanted prayer to heal and I prayed asking God to heal him. He was not giving up.     09/11/20 0304   Encounter Summary   Services provided to: Patient   Referral/Consult From: Emily   Continue Visiting Yes  (9/11)   Complexity of Encounter Low   Length of Encounter 15 minutes   Routine   Type Initial   Assessment Approachable;Calm   Intervention [place];Prayer;Nurtured hope   Outcome Acceptance;Expressed gratitude; Hopeful;Encouraged;Receptive

## 2020-09-11 NOTE — PLAN OF CARE
Problem: Restraint Use - Nonviolent/Non-Self-Destructive Behavior:  Goal: Absence of restraint indications  Description: Absence of restraint indications  Outcome: Ongoing  Note: Pt attempted to pull at ETT. Redirection and reorientation unsuccessful. Pt has intermittent agitation. Start soft bilateral wrist restraints    Goal: Absence of restraint-related injury  Description: Absence of restraint-related injury  Outcome: Ongoing  Note: Absence of restraint related injury     Care plan reviewed with patient.  Will review and discuss care plan with family when available

## 2020-09-11 NOTE — PROGRESS NOTES
Comprehensive Nutrition Assessment    Type and Reason for Visit:  Initial, Consult(TF ordering and management)    Nutrition Recommendations/Plan:   Continue general diet  Monitor PO for nutritional needs    Nutrition Assessment:  Pt. nutritionally compromised AEB recent NPO with intubation. At risk for further nutrition compromise r/t admitted with respiratory failure, months of SOB, and underlying medical condition (hx of depression, anxiety, vocal cord dysfuntion). Nutrition recommendations/interventions as per above. Malnutrition Assessment:  Malnutrition Status:  No malnutrition    Context:  Acute Illness     Findings of the 6 clinical characteristics of malnutrition:  Energy Intake:  Unable to assess  Weight Loss:  No significant weight loss     Body Fat Loss:  No significant body fat loss     Muscle Mass Loss:  No significant muscle mass loss    Fluid Accumulation:  1 - Mild Extremities   Strength:  Not Performed    Estimated Daily Nutrient Needs:  Energy (kcal):  0393-3644 (15-18 kcals/kg); Weight Used for Energy Requirements:  Current(115 kg, 9/11/20, bedscale)     Protein (g):  ~101 (~1.2 grams/kg); Weight Used for Protein Requirements:  Ideal(84 kg, 9/11/20)        Fluid (ml/day):  per MD;     Nutrition Related Findings:  Patient extubated today. Per rounds: patient with hx of SOB for several months, has seen multiple doctors, and has appointment at [facility]. Patient stated on general diet. POC:  135. Humalog coverage      Wounds:  None       Current Nutrition Therapies:    DIET GENERAL;     Anthropometric Measures:  · Height: 6' 1\" (185.4 cm)  · Current Body Weight: 252 lb (114.3 kg)(9/11/20, bedscale, trace edema to BLE & BUE)   · Admission Body Weight: 252 lb (114.3 kg)(9/11/20, bedscale, trace edema to BLE & BUE)    · Usual Body Weight: 248 lb (112.5 kg)(EMR, 8/20/20, no weight source)     · Ideal Body Weight: 184 lbs;   · BMI: 33.3  · Adjusted Body Weight:  ; No Adjustment   · BMI Categories: Obese Class 1 (BMI 30.0-34. 9)       Nutrition Diagnosis:   · Inadequate oral intake related to impaired respiratory function as evidenced by (recent NPO with intubation)    Nutrition Interventions:   Food and/or Nutrient Delivery:  Continue Current Diet  Nutrition Education/Counseling:  No recommendation at this time   Coordination of Nutrition Care:  Continued Inpatient Monitoring, Interdisciplinary Rounds    Goals:  Patient will consume 75% or greater of meals during LOS       Nutrition Monitoring and Evaluation:   Food/Nutrient Intake Outcomes:  Diet Advancement/Tolerance, Food and Nutrient Intake  Physical Signs/Symptoms Outcomes:  Biochemical Data, Chewing or Swallowing, GI Status, Fluid Status or Edema, Hemodynamic Status, Meal Time Behavior, Nutrition Focused Physical Findings, Weight, Skin     Discharge Planning:     Too soon to determine     Electronically signed by Charmaine Beckham RD, LD on 9/11/20 at 1:46 PM EDT    Contact: .[phone]

## 2020-09-11 NOTE — CARE COORDINATION
9/11/20, 7:48 AM EDT  DISCHARGE PLANNING EVALUATION:    Kaleb Kulkarni       Admitted from: ED 9/10/2020/ [facility] day: 0   Location: 4D-11/011-A Reason for admit: Respiratory failure ([address] [J96.90] Status: IP  Admit order signed?: yes  PMH:  has a past medical history of Anxiety, Chronic rhinosinusitis, Depression, Hypercholesteremia, and Vocal cord dysfunction. Procedure:   9/10 CXR: Mild opacities, left greater than right. PA and lateral radiographs could be performed if indicated clinically  9/11 Intubated  9/11 CVC   Medications:  Scheduled Meds:   atorvastatin  10 mg Oral Daily    cetirizine  10 mg Oral Daily    QUEtiapine  50 mg Oral Nightly    sertraline  25 mg Oral Daily    famotidine (PEPCID) injection  20 mg Intravenous BID    enoxaparin  40 mg Subcutaneous Daily    dexamethasone  6 mg Intravenous Daily    albuterol  5 mg Nebulization 4 times per day    insulin lispro  0-6 Units Subcutaneous 4 times per day    sodium chloride flush  10 mL Intravenous 2 times per day    nafcillin  1 g Intravenous Q4H     Continuous Infusions:   fentaNYL (SUBLIMAZE) 500 mcg in sodium chloride 0.9 % 100 mL 25 mcg/hr (09/11/20 0156)    sodium chloride 100 mL/hr at 09/11/20 0341    propofol 30 mcg/kg/min (09/11/20 0603)    dextrose        Pertinent Info/Orders/Treatment Plan: Presented with c/o SOB. Sats 62% on 6L O2 and was dyspneic, diaphoretic, and audible wheezing on presentation. Intubated in ED. Remains on vent w/ETT on  PCV, peep 6, FIO2 40%, sats 96%. Follows commands. Dietitian consulted. PT/OT. Respiratory culture +Staph aureus by PCR. COVID 19 was neg. Cardiac monitoring, I&O, daily weight, oral care, OG to LIWS, tracy care. IVF, fentanyl @ 25 mcg/hr, diprivan @ 30 mcg/kg/min, nebs, lipitor, zyrtec, IV decadron 6 mg daily, lovenox, pepcid, SSI Q6H, IV nafcillin, seroquel, zoloft. Creat 1.3, trop neg, wbc 12.3. Urine tox +benzos and cannabinoids. Blood and urine cultures sent.   Diet: No diet orders on file   Smoking status:  reports that he quit smoking about 11 years ago. His smoking use included cigarettes. He started smoking about 25 years ago. He has a 14.00 pack-year smoking history. He has never used smokeless tobacco.   PCP: JOHN Gonzalez CNP  Readmission 30 days or less: no  Readmission Risk Score: 17%    Discharge Planning Evaluation  Current Residence:     Living Arrangements:      Support Systems:     Current Services PTA:     Potential Assistance Needed:     Potential Assistance Purchasing Medications:     Does patient want to participate in local refill/ meds to beds program?     Type of Home Care Services:     Patient expects to be discharged to:     Expected Discharge date: Follow Up Appointment: Best Day/ Time:      Patient Goals/Plan/Treatment Preferences: Spoke with Anthony's wife, Bryanna Pulido, by phone at [phone]; states Edith Chavarria uses a cpap and nebulizer at home. She states he did not have home O2; states his PCP 'wanted to get him oxygen, but his insurance wouldn't pay for it'. He drives and cares for himself independently. PCP listed is correct. He did not have any HH services PTA. Plan is for Edith Chavarria to return home at discharge, unsure of needs at this time. Monitor for needs and home O2 eval if unable to wean. Transportation/Food Security/Housekeeping Addressed:  No issues identified.     Evaluation: no

## 2020-09-11 NOTE — ED NOTES
Pt remains comfortably sedated, no distress noted. VSS at this time.       Bhumika Murcia RN  09/11/20 6894

## 2020-09-11 NOTE — ED NOTES
Pt comfortably sedated at this time. Dr Nancy Quinn preparing to place subclavian CVC.       Hal Pete RN  09/11/20 0030

## 2020-09-11 NOTE — PROGRESS NOTES
Arouses to name- does follow commands. Nods head 'no' to pain and SOB. 0930- Shamir hugger off.   1141- Extubated to 2L/NC. Wife at bedside. 1500- Watching TV from bed- no distress noted.   1720- Up at side of bed-   1825- Transferred per wheelchair to 4K 21

## 2020-09-11 NOTE — ED PROVIDER NOTES
[address] ENCOUNTER      PATIENT NAME: Taya Watkins  MRN: [id number]  : 1980  PERALTA: 9/10/2020  PROVIDER: Agatha Vicente MD      CHIEF COMPLAINT       Chief Complaint   Patient presents with    Shortness of Breath       Nurses Notes reviewed and I agreeexcept as noted in the HPI. HISTORY OF PRESENT ILLNESS    Taya Watkins is a 38 y.o. male who presents to Emergency Department with Shortness of Breath      Past medical history remarkable for depression, anxiety, GERD and vocal cord dysfunction. Patient was brought in by EMS because of severe respiratory distress tongiht with SaO2 around 55% on room air. Patient was sweating clammy and lethargic. Patient had audible wheezing from both lungs. Limited history from patient due to his respiratory distress status. Medical record review showed in 2020 patient was seen at [facility] because of vocal cord dysfunction. Medical record also indicates other history including acute hypoxemic respiratory failure, history of reactive airway disease with possible exacerbation. This HPI was obtained by EMS report and medical record review    REVIEW OF SYSTEMS     Review of [facility] 77    has a past medical history of Anxiety, Chronic rhinosinusitis, Depression, Hypercholesteremia, and Vocal cord dysfunction. SURGICAL HISTORY      has a past surgical history that includes sinus surgery (2016).     CURRENT MEDICATIONS       Current Discharge Medication List      CONTINUE these medications which have NOT CHANGED    Details   cetirizine (ZYRTEC ALLERGY) 10 MG tablet Take 10 mg by mouth daily      omeprazole (PRILOSEC) 40 MG delayed release capsule Take 40 mg by mouth daily      atorvastatin (LIPITOR) 10 MG tablet Take 10 mg by mouth daily      sertraline (ZOLOFT) 25 MG tablet Take 25 mg by mouth daily      DULoxetine HCl (CYMBALTA PO) Take 1 tablet by mouth daily      Melatonin 10 MG CAPS Take 2 capsules by mouth nightly      QUEtiapine (SEROQUEL) 50 MG tablet Take 50 mg by mouth nightly      azithromycin (ZITHROMAX Z-SHAILA) 250 MG tablet Take 250 mg by mouth daily      albuterol (PROVENTIL) (2.5 MG/3ML) 0.083% nebulizer solution Take 2.5 mg by nebulization every 6 hours as needed for Wheezing      Albuterol Sulfate (PROAIR HFA IN) Inhale 1 puff into the lungs as needed             ALLERGIES     is allergic to cat hair extract and seasonal.    FAMILY HISTORY     He indicated that the status of his mother is unknown. He indicated that the status of his father is unknown.   family history includes No Known Problems in his father and mother. SOCIAL HISTORY      reports that he quit smoking about 11 years ago. His smoking use included cigarettes. He started smoking about 25 years ago. He has a 14.00 pack-year smoking history. He has never used smokeless tobacco. He reports current alcohol use. He reports that he does not use drugs. PHYSICAL EXAM     INITIAL VITALS:  height is 6' 1\" (1.854 m) and weight is 252 lb 6.8 oz (114.5 kg). His bladder temperature is 97.5 °F (36.4 °C). His blood pressure is 115/76 and his pulse is 72. His respiration is 11 and oxygen saturation is 96%. Physical Exam  Vitals signs and nursing note reviewed. Constitutional:       Appearance: He is well-developed. He is not diaphoretic. Comments: Lethargic in severe respiratory distress   HENT:      Head: Normocephalic and atraumatic. Nose: Nose normal.   Eyes:      General: No scleral icterus. Right eye: No discharge. Left eye: No discharge. Conjunctiva/sclera: Conjunctivae normal.      Pupils: Pupils are equal, round, and reactive to light. Neck:      Musculoskeletal: Normal range of motion and neck supple. Vascular: No JVD. Trachea: No tracheal deviation. Cardiovascular:      Rate and Rhythm: Normal rate and regular rhythm. Heart sounds: Normal heart sounds.  No murmur. No friction rub. No gallop. Pulmonary:      Effort: Pulmonary effort is normal. No respiratory distress. Breath sounds: No stridor. Examination of the right-upper field reveals decreased breath sounds and wheezing. Examination of the left-upper field reveals decreased breath sounds and wheezing. Examination of the right-middle field reveals decreased breath sounds and wheezing. Examination of the left-middle field reveals decreased breath sounds and wheezing. Examination of the right-lower field reveals decreased breath sounds and wheezing. Examination of the left-lower field reveals decreased breath sounds and wheezing. Decreased breath sounds and wheezing present. No rales. Chest:      Chest wall: No tenderness. Abdominal:      General: Bowel sounds are normal. There is no distension. Palpations: Abdomen is soft. There is no mass. Tenderness: There is no abdominal tenderness. There is no guarding or rebound. Hernia: No hernia is present. Musculoskeletal:         General: No tenderness or deformity. Lymphadenopathy:      Cervical: No cervical adenopathy. Skin:     Capillary Refill: Capillary refill takes less than 2 seconds. Coloration: Skin is not pale. Findings: No erythema or rash. Comments: Skin is cold and clammy   Neurological:      Cranial Nerves: No cranial nerve deficit. Sensory: No sensory deficit. Motor: No abnormal muscle tone.       Coordination: Coordination normal.      Deep Tendon Reflexes: Reflexes normal.           DIFFERENTIAL DIAGNOSIS:   Bronchitis, pneumonia, COPD exacerbation, CHF, ACS, Asthma, PE, Anxiety    DIAGNOSTIC RESULTS     EKG: All EKG's are interpreted by the Emergency Department Physician who either signs or Co-signsthis chart in the absence of a cardiologist.  Interpreted by me  Sinus tachycardia, PAC  Ventricular rate 106 bpm  DC interval 140 ms  QRS duration 84 ms   ms  Prolonged QT  No ST elevation or QT Basophils 0.8 %    Immature Granulocytes 0.6 %    Atypical Lymphocytes FEW %    Platelet Estimate ADEQUATE Adequate    Segs Absolute 8.6 (H) 1.8 - 7.7 thou/mm3    Lymphocytes Absolute 5.7 (H) 1.0 - 4.8 thou/mm3    Monocytes Absolute 1.0 0.4 - 1.3 thou/mm3    Eosinophils Absolute 1.3 (H) 0.0 - 0.4 thou/mm3    Basophils Absolute 0.1 0.0 - 0.1 thou/mm3    Immature Grans (Abs) 0.10 (H) 0.00 - 0.07 thou/mm3    nRBC 0 /100 wbc   Comprehensive Metabolic Panel w/ Reflex to MG   Result Value Ref Range    Glucose 219 (H) 70 - 108 mg/dL    CREATININE 1.4 (H) 0.4 - 1.2 mg/dL    BUN 18 7 - 22 mg/dL    Sodium 140 135 - 145 meq/L    Potassium reflex Magnesium 4.1 3.5 - 5.2 meq/L    Chloride 101 98 - 111 meq/L    CO2 25 23 - 33 meq/L    Calcium 9.5 8.5 - 10.5 mg/dL    AST 22 5 - 40 U/L    Alkaline Phosphatase 98 38 - 126 U/L    Total Protein 7.5 6.1 - 8.0 g/dL    Alb 4.4 3.5 - 5.1 g/dL    Total Bilirubin 0.4 0.3 - 1.2 mg/dL    ALT 18 11 - 66 U/L   Troponin   Result Value Ref Range    Troponin T < 0.010 ng/ml   Brain Natriuretic Peptide   Result Value Ref Range    Pro-BNP 88.4 0.0 - 450.0 pg/mL   Protime-INR   Result Value Ref Range    INR 0.97 0.85 - 1.13   APTT   Result Value Ref Range    aPTT 33.1 22.0 - 38.0 seconds   Magnesium   Result Value Ref Range    Magnesium 2.2 1.6 - 2.4 mg/dL   Procalcitonin   Result Value Ref Range    Procalcitonin 0.04 0.01 - 0.09 ng/mL   Lactic acid, plasma   Result Value Ref Range    Lactic Acid 1.0 0.5 - 2.2 mmol/L   Blood Gas, Arterial   Result Value Ref Range    pH, Blood Gas 6.99 (LL) 7.35 - 7.45    PCO2 138 (HH) 35 - 45 mmhg    PO2 153 (H) 71 - 104 mmhg    HCO3 34 (H) 23 - 28 mmol/l    Base Excess (Calculated) -4.0 (L) -2.5 - 2.5 mmol/l    O2 Sat 97 %    COLLECTED BY: [id number]    COVID-19   Result Value Ref Range    SARS-CoV-2, NAAT NOT DETECTED NOT DETECTED   Anion Gap   Result Value Ref Range    Anion Gap 14.0 8.0 - 16.0 meq/L   Glomerular Filtration Rate, Estimated   Result Value Ref Range Est, Glom Filt Rate 56 (A) ml/min/1.73m2   Osmolality   Result Value Ref Range    Osmolality Calc 288.0 275.0 - 300.0 mOsmol/kg   Scan of Blood Smear   Result Value Ref Range    SCAN OF BLOOD SMEAR see below    Blood Gas, Arterial   Result Value Ref Range    pH, Blood Gas 7.28 (L) 7.35 - 7.45    PCO2 59 (H) 35 - 45 mmhg    PO2 63 (L) 71 - 104 mmhg    HCO3 28 23 - 28 mmol/l    Base Excess (Calculated) -0.7 -2.5 - 2.5 mmol/l    O2 Sat 88 %    IFIO2 50     DEVICE Adult Vent     Mode PC     SET RESPIRATORY RATE 20 bpm    SET PEEP 6.0 mmhg    PIP 35 cmH2O    Benja Test Positive     Source: L Radial     COLLECTED BY: [id number]    Urine Drug Screen   Result Value Ref Range    AMPHETAMINE+METHAMPHETAMINE URINE SCREEN Negative NEGATIVE    Barbiturate Quant, Ur Negative NEGATIVE    Benzodiazepine Quant, Ur POSITIVE NEGATIVE    Cannabinoid Quant, Ur POSITIVE NEGATIVE    Cocaine Metab Quant, Ur Negative NEGATIVE    Opiates, Urine Negative NEGATIVE    Oxycodone Negative NEGATIVE    PCP Quant, Ur Negative NEGATIVE   Urine with Reflexed Micro   Result Value Ref Range    Glucose, Ur NEGATIVE NEGATIVE mg/dl    Bilirubin Urine NEGATIVE NEGATIVE    Ketones, Urine NEGATIVE NEGATIVE    Specific Gravity, Urine 1.020 1.002 - 1.030    Blood, Urine NEGATIVE NEGATIVE    pH, UA 5.0 5.0 - 9.0    Protein, UA 30 (A) NEGATIVE    Urobilinogen, Urine 0.2 0.0 - 1.0 eu/dl    Nitrite, Urine NEGATIVE NEGATIVE    Leukocyte Esterase, Urine NEGATIVE NEGATIVE    Color, UA YELLOW STRAW-YELLOW    Character, Urine CLEAR CLEAR-SL CLOUD    RBC, UA 0-2 0-2/hpf /hpf    WBC, UA 0-2 0-4/hpf /hpf    Epithelial Cells, UA 0-2 3-5/hpf /hpf    Bacteria, UA NONE SEEN FEW/NONE SEEN /hpf    Casts UA NONE SEEN NONE SEEN /lpf    Crystals, UA NONE SEEN NONE SEEN    Renal Epithelial, UA NONE SEEN NONE SEEN    Yeast, UA NONE SEEN NONE SEEN    CASTS 2 NONE SEEN NONE SEEN /lpf    MISCELLANEOUS 2 NONE SEEN    MRSA by PCR   Result Value Ref Range    MRSA SCREEN RT-PCR NEGATIVE Basic Metabolic Panel   Result Value Ref Range    Sodium 141 135 - 145 meq/L    Potassium 4.2 3.5 - 5.2 meq/L    Chloride 106 98 - 111 meq/L    CO2 25 23 - 33 meq/L    Glucose 159 (H) 70 - 108 mg/dL    BUN 19 7 - 22 mg/dL    CREATININE 1.3 (H) 0.4 - 1.2 mg/dL    Calcium 8.4 (L) 8.5 - 10.5 mg/dL   Magnesium   Result Value Ref Range    Magnesium 2.2 1.6 - 2.4 mg/dL   CBC auto differential   Result Value Ref Range    WBC 12.3 (H) 4.8 - 10.8 thou/mm3    RBC 5.55 4.70 - 6.10 mill/mm3    Hemoglobin 14.4 14.0 - 18.0 gm/dl    Hematocrit 45.9 42.0 - 52.0 %    MCV 82.7 80.0 - 94.0 fL    MCH 25.9 (L) 26.0 - 33.0 pg    MCHC 31.4 (L) 32.2 - 35.5 gm/dl    RDW-CV 14.0 11.5 - 14.5 %    RDW-SD 40.3 35.0 - 45.0 fL    Platelets 611 690 - 724 thou/mm3    MPV 11.9 9.4 - 12.4 fL    Seg Neutrophils 93.1 %    Lymphocytes 3.4 %    Monocytes 1.7 %    Eosinophils 0.3 %    Basophils 0.2 %    Immature Granulocytes 1.3 %    Segs Absolute 11.5 (H) 1.8 - 7.7 thou/mm3    Lymphocytes Absolute 0.4 (L) 1.0 - 4.8 thou/mm3    Monocytes Absolute 0.2 (L) 0.4 - 1.3 thou/mm3    Eosinophils Absolute 0.0 0.0 - 0.4 thou/mm3    Basophils Absolute 0.0 0.0 - 0.1 thou/mm3    Immature Grans (Abs) 0.16 (H) 0.00 - 0.07 thou/mm3    nRBC 0 /100 wbc   Anion Gap   Result Value Ref Range    Anion Gap 10.0 8.0 - 16.0 meq/L   Glomerular Filtration Rate, Estimated   Result Value Ref Range    Est, Glom Filt Rate 61 (A) ml/min/1.73m2   POCT glucose   Result Value Ref Range    POC Glucose 135 (H) 70 - 108 mg/dl   EKG Chest Pain   Result Value Ref Range    Ventricular Rate 106 BPM    Atrial Rate 106 BPM    P-R Interval 140 ms    QRS Duration 84 ms    Q-T Interval 398 ms    QTc Calculation (Bazett) 528 ms    P Axis 67 degrees    R Axis 13 degrees    T Axis 57 degrees       EMERGENCY DEPARTMENT COURSE:   Vitals:    Vitals:    09/11/20 0617 09/11/20 0635 09/11/20 0703 09/11/20 0715   BP:   115/76    Pulse:   72    Resp:  14 11    Temp: 97.3 °F (36.3 °C)   97.5 °F (36.4 °C) TempSrc: Bladder   Bladder   SpO2:  95% 96%    Weight:       Height:           23:25 PM: Patient is seen and evaluated in a timely fashion. ACTIONS:    Large bore IV  Tele monitor  Bipap  EKG  CXR  Labs  Medications: 1 hour albuterol continuous treatment      MEDICAL DECISION MAKINGS:    Patient was in severe respiratory distress on arrival, patient was put on BiPAP at 20/10 cm H2O. ABG was obtained immediately which showed patient's pH 6.99, PCO2 132, PO2 153, and bicarb 34. Patient was in severe respiratory failure with hypercapnia. Decision was to intubate the patient. Patient was intubate without complication. After patient was intubated, a right subclavian central line was inserted. Laboratory results: WBC 16.8, glucose 219, creatinine 1.4, BUN 18, potassium 4.1, troponin less than 0.01, BNP 88, magnesium 2.2, procalcitonin 0.04, lactic acid 1.0. Repeated EKG after patient was put on ventilator with hyperventilation at RR at 20/minute showed improved ABG with pH up to 7.28, PCO2 59, PO2 63. Chest x-ray shows patient has no obvious acute intrathoracic findings. Patient's clinical course and ED work-up suggest this is reactive airway exacerbation. Patient was admitted to ICU. CRITICAL CARE:   CRITICAL CARE: There was a high probability of clinically significant/life threatening deterioration in this patient's condition which required my urgent intervention. Total critical care time was 60 minutes. This excludes any time for separately reportable procedures. CONSULTS:  ICU on call    PROCEDURES:  Intubation and right subclavian central line performed by resident physician under my direct supervision. Please refer to his separate notes for details. FINAL IMPRESSION      1.  Acute respiratory failure with hypoxia and hypercapnia (HCC)          DISPOSITION/PLAN   Admit    PATIENT REFERRED TO:  Indiana Quiroz, APRN - CNP  [address], [facility] 2  [address] [address]  [phone]            DISCHARGE MEDICATIONS:  Current Discharge Medication List          (Please note that portions of this note were completed with a voice recognition program.  Efforts were made to edit the dictations but occasionally words aremis-transcribed.)    MD Candelario Dsouza MD  09/11/20 1257

## 2020-09-11 NOTE — TELEPHONE ENCOUNTER
FYI wife left message stating patient was taken by squad to [facility] last night due to difficulty breathing. Patient is currently on a ventilator.

## 2020-09-11 NOTE — PLAN OF CARE
Problem: Nutrition  Goal: Optimal nutrition therapy  Outcome: Ongoing   Nutrition Problem #1: Inadequate oral intake  Intervention: Food and/or Nutrient Delivery: Continue Current Diet  Nutritional Goals: Patient will consume 75% or greater of meals during LOS

## 2020-09-11 NOTE — PROGRESS NOTES
Patient has been successfully weaned from Mechanical Ventilation. Patient extubated and placed on 2 liters/min via nasal cannula per doctor order. Post extubation SpO2 is 94% with HR  76 bpm and RR 12 breaths/min. Patient had moderate cough that was non-productive. Extubation Well tolerated by patient. Halima Hercules

## 2020-09-11 NOTE — PLAN OF CARE
[facility] THERAPY MISSED TREATMENT NOTE  [facility] ICU 4D  [id number]      Date: 2020  Patient Name: Derrick Hernandez        CSN: [id number]   : 1980  (38 y.o.)  Gender: male                REASON FOR MISSED TREATMENT: Pt admitted on 9/10/20 with intubation. Will check back as time allows.

## 2020-09-11 NOTE — TELEPHONE ENCOUNTER
Spoke with Jayda Foote, she is wondering where patient stands with CC. She states Jimi Oh said she sent all info to [address] [place] [address] on 9/9 and patient received email from [address] [place] [address].   Informed patient to reach out to [address] [place] [address] (get info from email) and see if they can get in asap and call back if she needs anything else

## 2020-09-11 NOTE — SIGNIFICANT EVENT
Patient doing well with spontaneous breathing trial.  On CPAP 6, pressure support 10, FiO2 30%: Oxygen saturation is 97% with a spontaneous respiratory rate of 15 and a spontaneous tidal volume of 600. Proceed with extubation. Electronically signed by Luis Alberto Harvey MD.

## 2020-09-11 NOTE — ED NOTES
Bed: 022A  Expected date:   Expected time:   Means of arrival: Hayde  Comments:     Mallory Young RN  09/10/20 [id number]

## 2020-09-11 NOTE — ED PROVIDER NOTES
[address] [facility] COMPLAINT  Chief Complaint   Patient presents with    Shortness of Breath       Nurses Notes reviewed and I agree except as noted in the HPI. HPI    Marlene Mercado is a 38 y.o. male with past medical history of vocal cord dysfunction, granulomatous disease treated at [facility] who presents for evaluation of subsequent episode of difficulty breathing, these spells which typically last for weeks at times have been occuring for the past 4 years. On arrival from [place] patient is oxygen saturation was 62% on 6 L nasal cannula, patient was diaphoretic with labored breathing and bilateral wheezing. REVIEW OF SYSTEMS  Review of Systems   Unable to perform ROS: Severe respiratory distress       PAST MEDICAL HISTORY   has a past medical history of Anxiety, Chronic rhinosinusitis, Depression, Hypercholesteremia, and Vocal cord dysfunction. SURGICAL HISTORY   has a past surgical history that includes sinus surgery (12/01/2016).     CURRENT MEDICATIONS  Previous Medications    ALBUTEROL (PROVENTIL) (2.5 MG/3ML) 0.083% NEBULIZER SOLUTION    Take 2.5 mg by nebulization every 6 hours as needed for Wheezing    ALBUTEROL SULFATE (PROAIR HFA IN)    Inhale 1 puff into the lungs as needed    ATORVASTATIN (LIPITOR) 10 MG TABLET    Take 10 mg by mouth daily    AZITHROMYCIN (ZITHROMAX Z-SHAILA) 250 MG TABLET    Take 250 mg by mouth daily    CETIRIZINE (ZYRTEC ALLERGY) 10 MG TABLET    Take 10 mg by mouth daily    DULOXETINE HCL (CYMBALTA PO)    Take 1 tablet by mouth daily    MELATONIN 10 MG CAPS    Take 2 capsules by mouth nightly    OMEPRAZOLE (PRILOSEC) 40 MG DELAYED RELEASE CAPSULE    Take 40 mg by mouth daily    QUETIAPINE (SEROQUEL) 50 MG TABLET    Take 50 mg by mouth nightly    SERTRALINE (ZOLOFT) 25 MG TABLET    Take 25 mg by mouth daily       ALLERGIES  is allergic to cat hair extract and seasonal.    FAMILY HISTORY  He indicated that the status of his mother is unknown. He indicated that the status of his father is unknown.   family history includes No Known Problems in his father and mother. SOCIAL HISTORY   reports that he quit smoking about 11 years ago. His smoking use included cigarettes. He started smoking about 25 years ago. He has a 14.00 pack-year smoking history. He has never used smokeless tobacco. He reports current alcohol use. He reports that he does not use drugs. PHYSICAL EXAM     INITIAL VITALS: BP (!) 183/100   Pulse 112   Temp 96.9 °F (36.1 °C) (Oral)   Resp (!) 32   Ht 6' 1\" (1.854 m)   Wt 248 lb (112.5 kg)   SpO2 95%   BMI 32.72 kg/m² Estimated body mass index is 32.72 kg/m² as calculated from the following:    Height as of this encounter: 6' 1\" (1.854 m). Weight as of this encounter: 248 lb (112.5 kg). Physical Exam  Vitals signs and nursing note reviewed. Constitutional:       General: He is in acute distress. Appearance: He is overweight. He is diaphoretic. Interventions: He is sedated and intubated. HENT:      Head: Normocephalic and atraumatic. Right Ear: External ear normal.      Left Ear: External ear normal.      Nose: Nose normal.      Mouth/Throat:      Mouth: Mucous membranes are moist.   Eyes:      General:         Right eye: No discharge. Left eye: No discharge. Neck:      Musculoskeletal: Neck supple. Cardiovascular:      Rate and Rhythm: Tachycardia present. Pulmonary:      Effort: Respiratory distress present. He is intubated. Breath sounds: Wheezing present. Comments: Tachypneic  Abdominal:      General: Abdomen is flat. There is no distension. Musculoskeletal:         General: No signs of injury. Skin:     General: Skin is warm. Neurological:      General: No focal deficit present. Mental Status: He is lethargic. MEDICAL DECISION MAKING  Patient transferred from [facility] to [address] [place] [address] ED for acute respiratory failure.   Patient was saturating 62% on 6 L nasal cannula, hypertensive 183/100, tachypneic respiratory rate 32 with bilateral wheezing and increased respiratory effort. Patient was started on a trial of noninvasive ventilation. ABG results returned and were consistent with hypercapnic respiratory acidosis with pH of 6.99, PCO2 138, bicarb 34. Decision was made to proceed with glidoscope intubation and right subclavian central line access. Patient was sedated on ketamine, vecuronium, and Versed. Patient was successfully intubated with tube size 8, tube was repositioned to 23 cm from the lips, breath sounds were equal bilaterally. There were moderate oropharyngeal secretions noted during intubation which were appropriately suctioned at that time. Orogastric tube was also placed. We then proceeded with right subclavian central line placement, successful without complications, ETT and subclavian CVC positioning was confirmed on chest x-ray. Please refer to procedure notes for more details. Patient's ventilation rate was adjusted to respiratory rate of 20. Patient's subsequent ABG results revealed improvement in acid-base status with pH of 7.28, PCO2 59, PO2 63, bicarb 28. Patient was then transferred to ICU. In the emergency department patient also received Solu-Medrol 125 mg IV and Zosyn 3.375 g IV. COVID-19 rapid swab not detected. Initial assessment: Hypoxic hypercapnic respiratory failure /respiratory acidosis    Plan: Sedated, intubated, subclavian central line placed, transferred to ICU. DIAGNOSTIC RESULTS    EKG    Clinical Impression: Sinus tachycardia, PAC, QT prolongation ( ms); otherwise no ST elevation, T wave inversions or QT wave. Ventricular rate 106. RADIOLOGY:  I have reviewed radiologic plain film image(s).  The plain films will be read or over read by the radiologist.All other non-plain film images(s) such as CT, Ultrasound and MRI have been read by the radiologist.  XR CHEST (2 VW) (Results Pending)       LABS:  Labs Reviewed   CULTURE, BLOOD 1   CULTURE, BLOOD 2   CBC WITH AUTO DIFFERENTIAL   COMPREHENSIVE METABOLIC PANEL W/ REFLEX TO MG FOR LOW K   TROPONIN   BRAIN NATRIURETIC PEPTIDE   PROTIME-INR   APTT   MAGNESIUM   PROCALCITONIN   LACTIC ACID, PLASMA   BLOOD GAS, ARTERIAL     All other unresulted laboratory test above are normal:    Vitals:    Vitals:    09/10/20 2325 09/10/20 2327 09/10/20 2335   BP:  (!) 183/100    Pulse:  112    Resp:  (!) 32    Temp:  96.9 °F (36.1 °C)    TempSrc:  Oral    SpO2: (!) 62% (!) 78% 95%   Weight:  248 lb (112.5 kg)    Height:  6' 1\" (1.854 m)        EMERGENCY DEPARTMENT COURSE:    Medications - No data to display         Controlled Substances Monitoring:  Periodic Controlled Substance Monitoring: No signs of potential drug abuse or diversion identified. Eliel Stout MD)    CRITICAL CARE:  None. CONSULTS:  None. PROCEDURES:  Intubation    Date/Time: 9/11/2020 1:07 AM  Performed by: Eliel Stout MD  Authorized by: Michael Isaac MD     Consent:     Consent obtained:  Emergent situation    Consent given by:  Patient    Risks discussed:  Aspiration, brain injury, dental trauma, laryngeal injury, pneumothorax, hypoxia, death and bleeding    Alternatives discussed:  No treatment, delayed treatment, alternative treatment, observation and referral  Pre-procedure details:     Patient status:  Awake    Mallampati score:  II    Pretreatment meds: Ketamine. Paralytics:  Vecuronium  Procedure details:     Preoxygenation:  BiPAP    CPR in progress: no      Intubation method:  Oral    Oral intubation technique:  Video-assisted    Tube size (mm):  8.0    Tube type:  Cuffed    Number of attempts:  2    Ventilation between attempts: yes      Cricoid pressure: no      Tube visualized through cords: yes    Placement assessment:     ETT to lip:  23 cm    Tube secured with:   Adhesive tape and ETT ragland    Breath sounds:  Equal    Placement verification: chest rise, condensation, CXR verification, direct visualization, equal breath sounds, ETCO2 detector and tube exhalation      CXR findings:  ETT in proper place  Post-procedure details:     Patient tolerance of procedure: Tolerated well, no immediate complications    Central Line    Date/Time: 9/11/2020 6:48 AM  Performed by: Margaret Mac MD  Authorized by: Imelda Valentin MD     Consent:     Consent obtained:  Emergent situation    Consent given by:  Patient    Risks discussed:  Arterial puncture, incorrect placement, nerve damage, pneumothorax, infection and bleeding    Alternatives discussed:  No treatment, delayed treatment, alternative treatment, observation and referral  Pre-procedure details:     Hand hygiene: Hand hygiene performed prior to insertion      Sterile barrier technique: All elements of maximal sterile technique followed      Skin preparation:  Alcohol, ChloraPrep, Betadine and 2% chlorhexidine    Skin preparation agent: Skin preparation agent completely dried prior to procedure    Sedation:     Sedation type:  Deep (Ketamine, Versed)  Anesthesia (see MAR for exact dosages): Anesthesia method:  None (Patient was sedated on ketamine, Versed without local anesthesia)  Procedure details:     Location:  R subclavian    Patient position:  Trendelenburg    Procedural supplies:  Triple lumen    . procdoc    FINAL IMPRESSION:  No diagnosis found. DISPOSITION/PLAN:  PATIENT REFERRED TO:  No follow-up provider specified. DISCHARGE MEDICATIONS:  New Prescriptions    No medications on file         (Please note that portions of this note were completed with a voice recognition program and electronically transcribed. Efforts were made to edit the dictations but occasionally words are mis-transcribed. This transcription may contain errors not detected in proofreading.  This transcription was electronically signed.)    Electronically signed by Margaret Mac MD on 9/10/2020 at 11:37 PM     [facility]

## 2020-09-11 NOTE — H&P
CRITICAL CARE- History & Physical      Patient: Giovani Crowell    Unit/Bed:02/002A  YOB: 1980  MRN: [id number]   Acct: [de-identified]   PCP: JOHN Guillaume CNP    Date of Service: Pt seen/examined on 09/11/20  and Admitted to Inpatient with expected LOS greater than two midnights due to medical therapy. Chief Complaint: Shortness of breath    Assessment and Plan:-  1. Acute Hypercapnic/Hypoxic Respiratory Failure: Orally intubated. Continue PCV mode of mechanical ventilation with goal peak pressure of 35 or less and plateau pressure 30 or less. 2. Acute Asthma Exacerbation: Continue scheduled BARRIE. Continue steroids. 3. Acute Kidney Injury: BUN 18/creatinine 1.4. IVF. Trend BMP. 4. Impaired Glucose: Reactive. Monitor. 5. Elevated Hematocrit: Dehydration versus chronic hypoxia. V fluids. 6. Leukocytosis: Reactive, no clear source of infection. Procalcitonin 0.04. No need to continue antibiotics at this time. IV fluids. Trend CBC. 7. Positive Toxicology: Urine toxicology positive for cannabinoids and benzodiazepines. 8. Chronic Allergies: Continue Zyrtec. 9. Major Depression: Continue Seroquel and Zoloft. Unable to continue Cymbalta as cannot be administered per OG. 10. Obstructive Sleep Apnea: Home CPAP when appropriate. 11. Hyperlipidemia: Continue Lipitor. 12. Obesity: BMI 32.72. Weight loss resources to be provided when appropriate. History Of Present Illness: (Obtained per medical record as no family present and patient sedated on mechanical ventilation)    50-year-old obese male, former smoker, past medical history vocal cord dysfunction, hyperlipidemia, depression, anxiety, rhino-sinusitis who presented on 9/10/2020 to [facility]'s emergency department per EMS for shortness of breath. Oxygen saturation was 62% on 6 L nasal cannula and patient was dyspneic and diaphoretic with audible bilateral wheezing.      Incidentally per review of notes patient was seen by Mya Thomson CNP on 8/20/2020 and it was documented that patient has had severe sinus problems beginning 4 years ago with associated shortness of breath. At one point patient was diagnosed with vocal cord dysfunction but treatment provided no relief. Patient also with history of multiple sinus surgeries. During office visit evaluation patient pulse ox was 85% on room air and in obvious distress. He was prescribed home oxygen, as he required 4 L to maintain 92% pulse ox, steroids and a Z-shaila. He was also referred to [facility]. Patient was last hospitalized in April with pneumonia in [place]. He does follow with ENT, Allergy, and Pulmonology outpatient and has received \"allergy injections\" previously. Patient was intubated in the emergency department. He did receive 2 g IV magnesium, 15 mg nebulizer treatment, 125 mg IV Solu-Medrol, 1 dose Zosyn, and 1 dose vancomycin. He was admitted to the Intensive Care unit for further management. Past Medical History:        Diagnosis Date    Anxiety     Chronic rhinosinusitis     Depression     Hypercholesteremia     Vocal cord dysfunction        Past Surgical History:        Procedure Laterality Date    SINUS SURGERY  12/01/2016       Home Medications:   No current facility-administered medications on file prior to encounter.       Current Outpatient Medications on File Prior to Encounter   Medication Sig Dispense Refill    cetirizine (ZYRTEC ALLERGY) 10 MG tablet Take 10 mg by mouth daily      omeprazole (PRILOSEC) 40 MG delayed release capsule Take 40 mg by mouth daily      atorvastatin (LIPITOR) 10 MG tablet Take 10 mg by mouth daily      sertraline (ZOLOFT) 25 MG tablet Take 25 mg by mouth daily      DULoxetine HCl (CYMBALTA PO) Take 1 tablet by mouth daily      Melatonin 10 MG CAPS Take 2 capsules by mouth nightly      QUEtiapine (SEROQUEL) 50 MG tablet Take 50 mg by mouth nightly      azithromycin (ZITHROMAX Z-SHAILA) 250 MG tablet Take 250 mg by mouth daily      albuterol (PROVENTIL) (2.5 MG/3ML) 0.083% nebulizer solution Take 2.5 mg by nebulization every 6 hours as needed for Wheezing      Albuterol Sulfate (PROAIR HFA IN) Inhale 1 puff into the lungs as needed         Allergies:    Cat hair extract and Seasonal    Social History:    reports that he quit smoking about 11 years ago. His smoking use included cigarettes. He started smoking about 25 years ago. He has a 14.00 pack-year smoking history. He has never used smokeless tobacco. He reports current alcohol use. He reports that he does not use drugs. Family History:       Problem Relation Age of Onset    No Known Problems Mother     No Known Problems Father        Diet:  No diet orders on file    Review of systems:   Pertinent positives as noted in the HPI. All other systems reviewed and negative. PHYSICAL EXAMINATION:  Vital signs: Temperature 96.3, respirations 14, pulse 73, blood pressure 104/73, pulse ox 94% on 50% FiO2 per mechanical ventilation. Ventilator settings: PCV 16/8, peak pressure 14, respiratory rate 16, set rate 12. No intake/output data recorded. Wt Readings from Last 3 Encounters:   09/10/20 248 lb (112.5 kg)   08/20/20 248 lb (112.5 kg)      Body mass index is 32.72 kg/m². CAM-ICU:   Sedated. General:   Acutely ill-appearing  male resting in bed on mechanical ventilation. HEENT:  normocephalic and atraumatic. No scleral icterus. PERR  Neck: supple. No thyromegaly. Lungs: Bilateral upper lobes diminished and right lower lobe diminished, without wheezes/rales/rhonchi to auscultation. No retractions or tachypnea. Cardiac: RRR. No JVD. Abdomen: soft. Nontender. Hypoactive bowel sounds. Extremities:  No clubbing, cyanosis, or edema x 4. Vasculature: capillary refill < 3 seconds. Palpable dorsalis pedis pulses, 2+. Skin: Normal for ethnicity, warm, and dry. Psych: Confused. Lymph:  No supraclavicular adenopathy.   Neurologic:  No focal deficit. No seizures. Data: (All radiographs, tracings, PFTs, and imaging are personally viewed and interpreted unless otherwise noted).  Telemetry: Normal sinus rhythm.  Sodium 140, potassium 4.1, chloride 101, CO2 25, BUN 18, creatinine 1.4, anion gap 14.0, EGFR 56, magnesium 2.2, lactic acid 1.0, glucose 219, calcium 9.5, osmolality Calc 288.0, total protein 7.5, procalcitonin 0.04.   Pro BNP 88.4, troponin T less than 0.  010.  Albumin 4.4, alk phos 98, ALT 18, AST 22, bilirubin 0.4.  Toxicology positive benzodiazepines and cannabinoids.  WBC 16.8, hemoglobin 16.7, hematocrit 53.3, MCV 84.1, platelet count 038.  ABG on 9/10/2020: pH 6.99, PCO2 138, PO2 153, bicarb 34, base excess -4.0, oxygen saturation 97%.  ABG on 9/11/2020: pH 7.28, PCO2 59, PO2 63, bicarb 28, base excess 0.7, O2 sat 88% on 50% FiO2 per mechanical ventilation.  Chest x-ray report on 9/11/2020 stated mild opacities right greater than left.  CT chest report on 9/8/2020 demonstrated bilateral groundglass opacities and peribronchial distribution in upper lobes and in right middle lobe; more focal opacity in right upper lung still continuous with right upper lobe bronchi; peribronchial thickening. ICU PROPHYLAXIS/THERAPY:   Stress ulcer:  [] PPI Agent  [x] H2RA [] Sucralfate [] Other:   VTE:     [x] Enoxaparin    [] Warfarin [] NOAC [] PCD Device:Bilat LE   [] Heparin: [] Subcut / [] IV       Electronically signed by JOHN Verma CNP on 9/11/2020 at 1:00 AM  Ruslan Maurice. Patient seen by me. Case discussed with NP. Patient intubated for respiratory distress and respiratory failure from asthma exacerbation. Patient does not receive baseline control inhaler with inhaled corticosteroid. Patient also has a right lower lobe infiltrate secondary to MSSA. Nafcillin initiated 9/11/2020. Patient on systemic steroids for asthma exacerbation.   Patient will require rescue inhaler that includes inhaled corticosteroid and long-acting beta agonist therapy. Patient will clearly require asthma education when he recovers. Screen for immunoglobulin deficiency and screen for elevated IgE. Lavage lung and assess for eosinophils. CC time 35 minutes. Time does not include NP assessment. Time does include my direct assessment and coordination of care. Electronically signed by Woodson Dienes Emerson Schaumann MD.

## 2020-09-12 ENCOUNTER — APPOINTMENT (OUTPATIENT)
Dept: GENERAL RADIOLOGY | Age: 40
DRG: 208 | End: 2020-09-12
Payer: COMMERCIAL

## 2020-09-12 LAB
ANION GAP SERPL CALCULATED.3IONS-SCNC: 9 MEQ/L (ref 8–16)
BUN BLDV-MCNC: 19 MG/DL (ref 7–22)
C3 COMPLEMENT: 141 MG/DL (ref 90–180)
CALCIUM SERPL-MCNC: 8.8 MG/DL (ref 8.5–10.5)
CHLORIDE BLD-SCNC: 103 MEQ/L (ref 98–111)
CO2: 26 MEQ/L (ref 23–33)
COMPLEMENT C4: 29 MG/DL (ref 10–40)
CREAT SERPL-MCNC: 1 MG/DL (ref 0.4–1.2)
ERYTHROCYTE [DISTWIDTH] IN BLOOD BY AUTOMATED COUNT: 14.2 % (ref 11.5–14.5)
ERYTHROCYTE [DISTWIDTH] IN BLOOD BY AUTOMATED COUNT: 40.9 FL (ref 35–45)
GFR SERPL CREATININE-BSD FRML MDRD: 82 ML/MIN/1.73M2
GLUCOSE BLD-MCNC: 115 MG/DL (ref 70–108)
GLUCOSE BLD-MCNC: 119 MG/DL (ref 70–108)
GLUCOSE BLD-MCNC: 132 MG/DL (ref 70–108)
HCT VFR BLD CALC: 42 % (ref 42–52)
HEMOGLOBIN: 13.4 GM/DL (ref 14–18)
MCH RBC QN AUTO: 26.2 PG (ref 26–33)
MCHC RBC AUTO-ENTMCNC: 31.9 GM/DL (ref 32.2–35.5)
MCV RBC AUTO: 82.2 FL (ref 80–94)
PLATELET # BLD: 154 THOU/MM3 (ref 130–400)
PMV BLD AUTO: 11.9 FL (ref 9.4–12.4)
POTASSIUM SERPL-SCNC: 4 MEQ/L (ref 3.5–5.2)
RBC # BLD: 5.11 MILL/MM3 (ref 4.7–6.1)
SODIUM BLD-SCNC: 138 MEQ/L (ref 135–145)
WBC # BLD: 12.8 THOU/MM3 (ref 4.8–10.8)

## 2020-09-12 PROCEDURE — 2580000003 HC RX 258: Performed by: INTERNAL MEDICINE

## 2020-09-12 PROCEDURE — 2700000000 HC OXYGEN THERAPY PER DAY

## 2020-09-12 PROCEDURE — 99233 SBSQ HOSP IP/OBS HIGH 50: CPT | Performed by: INTERNAL MEDICINE

## 2020-09-12 PROCEDURE — 71045 X-RAY EXAM CHEST 1 VIEW: CPT

## 2020-09-12 PROCEDURE — 2500000003 HC RX 250 WO HCPCS: Performed by: INTERNAL MEDICINE

## 2020-09-12 PROCEDURE — 80048 BASIC METABOLIC PNL TOTAL CA: CPT

## 2020-09-12 PROCEDURE — 6360000002 HC RX W HCPCS: Performed by: NURSE PRACTITIONER

## 2020-09-12 PROCEDURE — 2580000003 HC RX 258: Performed by: NURSE PRACTITIONER

## 2020-09-12 PROCEDURE — 6370000000 HC RX 637 (ALT 250 FOR IP): Performed by: NURSE PRACTITIONER

## 2020-09-12 PROCEDURE — 2500000003 HC RX 250 WO HCPCS: Performed by: NURSE PRACTITIONER

## 2020-09-12 PROCEDURE — 94640 AIRWAY INHALATION TREATMENT: CPT

## 2020-09-12 PROCEDURE — 82948 REAGENT STRIP/BLOOD GLUCOSE: CPT

## 2020-09-12 PROCEDURE — 6360000002 HC RX W HCPCS: Performed by: INTERNAL MEDICINE

## 2020-09-12 PROCEDURE — 85027 COMPLETE CBC AUTOMATED: CPT

## 2020-09-12 PROCEDURE — 36415 COLL VENOUS BLD VENIPUNCTURE: CPT

## 2020-09-12 PROCEDURE — 2060000000 HC ICU INTERMEDIATE R&B

## 2020-09-12 PROCEDURE — 6370000000 HC RX 637 (ALT 250 FOR IP): Performed by: INTERNAL MEDICINE

## 2020-09-12 PROCEDURE — 94760 N-INVAS EAR/PLS OXIMETRY 1: CPT

## 2020-09-12 RX ORDER — DULOXETIN HYDROCHLORIDE 60 MG/1
60 CAPSULE, DELAYED RELEASE ORAL DAILY
Status: DISCONTINUED | OUTPATIENT
Start: 2020-09-12 | End: 2020-09-13 | Stop reason: HOSPADM

## 2020-09-12 RX ORDER — FAMOTIDINE 20 MG/1
20 TABLET, FILM COATED ORAL 2 TIMES DAILY
Status: DISCONTINUED | OUTPATIENT
Start: 2020-09-12 | End: 2020-09-13 | Stop reason: HOSPADM

## 2020-09-12 RX ORDER — DEXAMETHASONE 4 MG/1
6 TABLET ORAL DAILY
Status: DISCONTINUED | OUTPATIENT
Start: 2020-09-13 | End: 2020-09-13 | Stop reason: HOSPADM

## 2020-09-12 RX ORDER — ALBUTEROL SULFATE 2.5 MG/3ML
2.5 SOLUTION RESPIRATORY (INHALATION) EVERY 4 HOURS PRN
Status: DISCONTINUED | OUTPATIENT
Start: 2020-09-12 | End: 2020-09-13 | Stop reason: HOSPADM

## 2020-09-12 RX ADMIN — SODIUM CHLORIDE, PRESERVATIVE FREE 10 ML: 5 INJECTION INTRAVENOUS at 07:58

## 2020-09-12 RX ADMIN — ALBUTEROL SULFATE 5 MG: 2.5 SOLUTION RESPIRATORY (INHALATION) at 08:29

## 2020-09-12 RX ADMIN — ALBUTEROL SULFATE 5 MG: 2.5 SOLUTION RESPIRATORY (INHALATION) at 21:02

## 2020-09-12 RX ADMIN — BUDESONIDE AND FORMOTEROL FUMARATE DIHYDRATE 2 PUFF: 160; 4.5 AEROSOL RESPIRATORY (INHALATION) at 21:02

## 2020-09-12 RX ADMIN — CEFTRIAXONE SODIUM 1 G: 1 INJECTION, POWDER, FOR SOLUTION INTRAMUSCULAR; INTRAVENOUS at 10:42

## 2020-09-12 RX ADMIN — NAFCILLIN SODIUM 2 G: 2 INJECTION, POWDER, LYOPHILIZED, FOR SOLUTION INTRAMUSCULAR; INTRAVENOUS at 08:02

## 2020-09-12 RX ADMIN — SERTRALINE HYDROCHLORIDE 25 MG: 50 TABLET, FILM COATED ORAL at 07:57

## 2020-09-12 RX ADMIN — DULOXETINE HYDROCHLORIDE 60 MG: 60 CAPSULE, DELAYED RELEASE ORAL at 10:42

## 2020-09-12 RX ADMIN — CETIRIZINE HYDROCHLORIDE 10 MG: 10 TABLET ORAL at 07:57

## 2020-09-12 RX ADMIN — ALBUTEROL SULFATE 5 MG: 2.5 SOLUTION RESPIRATORY (INHALATION) at 16:03

## 2020-09-12 RX ADMIN — NAFCILLIN SODIUM 2 G: 2 INJECTION, POWDER, LYOPHILIZED, FOR SOLUTION INTRAMUSCULAR; INTRAVENOUS at 03:34

## 2020-09-12 RX ADMIN — ALBUTEROL SULFATE 5 MG: 2.5 SOLUTION RESPIRATORY (INHALATION) at 13:14

## 2020-09-12 RX ADMIN — FAMOTIDINE 20 MG: 20 TABLET, FILM COATED ORAL at 21:02

## 2020-09-12 RX ADMIN — DEXAMETHASONE SODIUM PHOSPHATE 6 MG: 4 INJECTION, SOLUTION INTRAMUSCULAR; INTRAVENOUS at 07:57

## 2020-09-12 RX ADMIN — FAMOTIDINE 20 MG: 10 INJECTION INTRAVENOUS at 07:58

## 2020-09-12 RX ADMIN — QUETIAPINE FUMARATE 50 MG: 25 TABLET ORAL at 21:02

## 2020-09-12 RX ADMIN — ENOXAPARIN SODIUM 40 MG: 40 INJECTION SUBCUTANEOUS at 07:57

## 2020-09-12 RX ADMIN — BUDESONIDE AND FORMOTEROL FUMARATE DIHYDRATE 2 PUFF: 160; 4.5 AEROSOL RESPIRATORY (INHALATION) at 08:31

## 2020-09-12 RX ADMIN — ATORVASTATIN CALCIUM 10 MG: 10 TABLET, FILM COATED ORAL at 07:57

## 2020-09-12 RX ADMIN — SODIUM CHLORIDE, PRESERVATIVE FREE 10 ML: 5 INJECTION INTRAVENOUS at 21:02

## 2020-09-12 ASSESSMENT — PAIN SCALES - GENERAL
PAINLEVEL_OUTOF10: 0

## 2020-09-12 NOTE — FLOWSHEET NOTE
09/11/20 2024   Provider Notification   Reason for Communication Evaluate   Provider Name Brandi East   Provider Notification Advance Practice Clinician (CNS, NP, CNM, CRNA, PA)   Method of Communication Secure Message   Response See orders   Notification Time 2024   Notified of patient wearing a cpap machine at home.

## 2020-09-12 NOTE — PROGRESS NOTES
Hospitalist Progress Note    Patient:  Vineet Leggett      Unit/Bed:4K-21/021-A    YOB: 1980    MRN: [id number]       Acct: [de-identified]     PCP: JOHN Ch CNP    Date of Admission: 9/10/2020    Active Hospital Problems    Diagnosis Date Noted    Respiratory failure [facility]) [J96.90] 09/11/2020       Assessment/Plan:  Acute Hypercapnic/Hypoxic Respiratory Failure: Secondary to reactive airway disease and severe asthma exacerbation. Patient presented to ED in respiratory failure. He was placed on BiPAP initially but was orally intubated to protect his airway. He was initially acidotic at 6.99, hypoxic at 55% on room air, and hypercapnic at 132. Patient extubated on 9/11/2020   - Continue stress dose Decadron and breathing treatments q4hr while awake   - Continue home Symbicort and monitor respiratory status    Acute Kidney Injury: (improving) Secondary to hypoxia. Initial BUN: 19 and Creatinine: 1.3. Labs from 9/12/2020 showed a BUN of 19 and creatinine of 1.0. Encourage PO hydration. Impaired Glucose: (improving) Secondary to stress dose Decadron.   - Monitor serum glucose q12hr    Leukocytosis: Likely reactive. Questionable pneumonia with a positive MSSA pneumonia panel. Patient received two days of Nafcillin which was switched to Rocephin.   - Continue Rocephin, Day 1 of 2     Hx Major Depression: On Seroquel, Zoloft, and Cymbalta, we will continue. Hx Allergies: On Zyrtec, we will continue. Hx Hyperlipidemia: On Lipitor, we will continue. Hx Obstructive Sleep Apnea: On CPAP at night, we will continue.         Expected discharge date:    9/14/2020       Disposition:      [x] Home                             [] TCU                             [] Rehab                             [] Psych                             [] SNF                             [] [place]                             [] Other-    Chief Complaint:   Chief Complaint   Patient presents with  Shortness of Breath       Hospital Course: Patient was seen, examined and the medical chart was reviewed thoroughly today. In summary, 38 y.o.male with a history of reactive airway disease, asthma, obstructive sleep apnea, allergies, hyperlipidemia, and major depressive disorder who was admitted on 9/10/2020 for dyspnea. The patient states that he began experiencing severe dyspnea on 9/10/2020. He explained that the dyspnea was constant and rapidly worsening. He reports similar previous episodes due to his reactive airway disease and asthma but reports this was the worst episode. He follows with Dr. Suzanne Karimi a pulmonologist at [facility] and is in the process of being evaluated at [facility] for further evaluation. He is supposed to undergo testing for Nicole's disease and other causes of his worsening respiratory status. In the emergency department the patient's vitals were: Temp: 97.3, HR: 72, BP: 115/76, RR: 11, SpO2: 96% on 20/10 BiPAP. Labs were drawn and showed: Hgb: 14.1, WBC: 12.3, BUN: 19 Creatinine: 1.3, pH: 6.99, PCO2: 132, PO2: 153, and HCO3: 34. EKG demonstrated: Sinus tachycardia at 106 bmp with a PAC. Chest x-ray shoed mild opacities in the left lower lung. The patient was intubated for airway protection, hypercapnia, and hypoxia and was admitted to the ICU    Patient was extubated on 9/11/2020 and transferred to ICU step-down. Subjective (past 24 hours):     Patient complaining of dyspnea this morning. He explained that it has gradually been worsening but is somewhat normal for him. He reports that he has episodes like this every day and has some relief with nebulizer treatments. The patient states that this admission was the first time he has had to be intubated for his pulmonary diseases. He denies fever, chills, nausea, vomiting, abdominal pain, diarrhea, constipation, urinary symptoms, lightheadedness, dizziness, chest pain, or palpitations.       Medications:  Reviewed    Infusion Medications   Scheduled Medications    famotidine  20 mg Oral BID    [START ON 9/13/2020] dexamethasone  6 mg Oral Daily    cefTRIAXone (ROCEPHIN) IV  1 g Intravenous Q24H    albuterol  5 mg Nebulization Q4H While awake    DULoxetine  60 mg Oral Daily    atorvastatin  10 mg Oral Daily    cetirizine  10 mg Oral Daily    QUEtiapine  50 mg Oral Nightly    sertraline  25 mg Oral Daily    enoxaparin  40 mg Subcutaneous Daily    sodium chloride flush  10 mL Intravenous 2 times per day    budesonide-formoterol  2 puff Inhalation BID     PRN Meds: albuterol, sodium chloride flush, acetaminophen **OR** acetaminophen, polyethylene glycol, promethazine **OR** ondansetron      Intake/Output Summary (Last 24 hours) at 9/12/2020 1106  Last data filed at 9/12/2020 1044  Gross per 24 hour   Intake 800 ml   Output 2900 ml   Net -2100 ml       Diet:  DIET GENERAL;    Exam:  /71   Pulse 80   Temp 98.4 °F (36.9 °C) (Oral)   Resp 24   Ht 6' 1\" (1.854 m)   Wt 264 lb 3.2 oz (119.8 kg)   SpO2 94%   BMI 34.86 kg/m²     General appearance: A&O x3, Not ill or toxic, Moderate apparent distress. HEENT:  MARIA ISABEL. Neck: Supple, with full range of motion. No jugular venous distention. Trachea midline. Respiratory (before breathing treatment): Moderate respiratory distress. Wheezing throughout, worst in the right base. Accessory muscle use. Tachypneic. Speaking in mostly full sentences. Sitting upright. Respiratory (after breathing treatment): Diffuse wheezing but improved. No accessory muscle use, improved tachypnea. Speaking comfortably while inclined in bed. Cardiovascular:  normal S1/S2 with no murmurs/gallops  Abdomen: Soft, non-tender, non-distended. Normal bowel sounds. Musculoskeletal: No clubbing or cyanosis. No edema. Skin: No rashes. Neurologic:  Alert and oriented x3. No focal deficits.   Capillary Refill: Brisk,< 3 seconds   Peripheral Pulses: +2 palpable, equal bilaterally      Labs:   Recent Labs 09/10/20  2329 09/11/20  0433 09/12/20  0510   WBC 16.8* 12.3* 12.8*   HGB 16.7 14.4 13.4*   HCT 53.3* 45.9 42.0    174 154     Recent Labs     09/10/20  2329 09/11/20  0433 09/11/20  1600 09/12/20  0510    141  --  138   K 4.1 4.2  --  4.0    106  --  103   CO2 25 25  --  26   BUN 18 19  --  19   CREATININE 1.4* 1.3* 1.3* 1.0   CALCIUM 9.5 8.4*  --  8.8     Recent Labs     09/10/20  2329   AST 22   ALT 18   BILITOT 0.4   ALKPHOS 98     Recent Labs     09/10/20  2329   INR 0.97     No results for input(s): Ross Merlos in the last 72 hours. Urinalysis:      Lab Results   Component Value Date    NITRU NEGATIVE 09/11/2020    WBCUA 0-2 09/11/2020    BACTERIA NONE SEEN 09/11/2020    RBCUA 0-2 09/11/2020    BLOODU NEGATIVE 09/11/2020    GLUCOSEU NEGATIVE 09/11/2020       Radiology:  Ct Chest Wo Contrast    Result Date: 9/8/2020  PROCEDURE: CT CHEST WO CONTRAST CLINICAL INFORMATION: Bronchial stenosis . COMPARISON: No prior study. TECHNIQUE: 2-D multiplanar noncontrast images of the chest All CT scans at this facility use dose modulation, iterative reconstruction, and/or weight-based dosing when appropriate to reduce radiation dose to as low as reasonably achievable. FINDINGS: There is mediastinal adenopathy. Partially calcified pretracheal node 1 cm short axis Multiple additional prominent but not enlarged lymph nodes in the mediastinum and prevascular space and subcarinal space. Calcified right hilar node. No axillary lymphadenopathy. 4 mm pericardial effusion. Curvilinear opacities left base. No effusions. Upper abdomen Nonenlarged retroperitoneal lymph nodes in the left. Adrenals are normal. High density material in the stomach may be related to medication or debris chronic. Bones No suspicious bone lesions. Bilateral groundglass opacities in a peribronchial distribution in the upper lobes and in the right middle lobe.  More focal opacity in the right upper lung still contiguous with the recognition software. It may contain minor errors which are inherent in voice recognition technology. ** Final report electronically signed by Dr. Rajinder Perea on 9/11/2020 1:15 AM    Xr Chest Portable    Result Date: 9/11/2020  PROCEDURE: XR CHEST PORTABLE CLINICAL INFORMATION: tube placement. COMPARISON: Multiple previous most recent 9/10/2020 TECHNIQUE: AP upright view of the chest. FINDINGS: Endotracheal tube is noted. Its tip is 4.3 cm above the royce. An NG tube is seen. Its tip is within the abdomen, below the film edge, though it is not visualized. A moderate portion of the left lung was clipped by the film. Mild upper lobe parenchymal opacities are again noted. They appear mildly worsened. Tube placement as above. **This report has been created using voice recognition software. It may contain minor errors which are inherent in voice recognition technology. ** Final report electronically signed by Dr. Rajinder Perea on 9/11/2020 12:51 AM    Xr Chest Portable    Result Date: 9/11/2020  PROCEDURE: XR CHEST PORTABLE CLINICAL INFORMATION: SOB. COMPARISON: No prior study. TECHNIQUE: AP upright view of the chest. FINDINGS: Heart size is normal for technique. Mild scattered airspace opacities are noted, left greater than right. Pneumonia could cause this as could atelectasis. Mild opacities, left greater than right. PA and lateral radiographs could be performed if indicated clinically. **This report has been created using voice recognition software. It may contain minor errors which are inherent in voice recognition technology. ** Final report electronically signed by Dr. Rajinder Perea on 9/11/2020 12:02 AM      Diet: DIET GENERAL;    DVT prophylaxis: [x] Lovenox                                  [] SCDs                                 [] SQ Heparin                                 [] Encourage ambulation           [] Already on Anticoagulation       Code Status: Full Code      Active Hospital Problems Diagnosis Date Noted    Respiratory failure [facility]) [J96.90] 09/11/2020          **This is a Medical/ PA/ APRN Student Note and is charted for educational purposes. The non-physician staff attested note is not to be used for billing purposes or to guide patient care. Please see the physician modifications/ attestation for treatment plan/suggestions. This note has been reviewed and feedback has been provided to the student.  **        Electronically signed by Maximus Schuler on 9/12/2020 at 11:06 AM

## 2020-09-12 NOTE — PLAN OF CARE
Intensivist Progress Note      Patient:  Alva Vega    Unit/Bed:4K-21/021-A  YOB: 1980  MRN: [id number]   Acct: [de-identified]     PCP: JOHN Stone CNP  Date of Admission: 9/10/2020    Patient was extubated from mechanical ventilation on 9/11/2020 1141. Sign out to Hospitalist per phone.      JOHN Saavedra CNP

## 2020-09-12 NOTE — PLAN OF CARE
Problem: Falls - Risk of:  Goal: Will remain free from falls  Description: Will remain free from falls  Outcome: Ongoing  Note: Patient absent of falls this shift, fall band intact, bed alarm set, falling star magnet in place. Problem: Falls - Risk of:  Goal: Absence of physical injury  Description: Absence of physical injury  Outcome: Ongoing  Note: Patient absent of physical injury this shift. Problem: Skin Integrity:  Goal: Will show no infection signs and symptoms  Description: Will show no infection signs and symptoms  Outcome: Ongoing  Note: Patient shows no new signs or symptoms of infection at this time. Problem: Skin Integrity:  Goal: Absence of new skin breakdown  Description: Absence of new skin breakdown  Outcome: Ongoing  Note: Skin integrity intact. Patient educated to frequently turn in bed to reduce pressure ulcers. Problem: Nutrition  Goal: Optimal nutrition therapy  9/11/2020 2234 by Tierra Patrick RN  Outcome: Ongoing  Note: Patient nutrient remains adequate at this time. Patient has no complaints of difficulty chewing or swallowing at this time. Care plan reviewed with patient. Patient verbalizes understanding of the care plan and contributed to goal setting.

## 2020-09-13 VITALS
DIASTOLIC BLOOD PRESSURE: 81 MMHG | WEIGHT: 264.2 LBS | TEMPERATURE: 98.1 F | RESPIRATION RATE: 16 BRPM | HEART RATE: 67 BPM | OXYGEN SATURATION: 96 % | HEIGHT: 73 IN | SYSTOLIC BLOOD PRESSURE: 122 MMHG | BODY MASS INDEX: 35.02 KG/M2

## 2020-09-13 LAB
GRAM STAIN RESULT: NORMAL
RESPIRATORY CULTURE: NORMAL
URINE CULTURE, ROUTINE: NORMAL

## 2020-09-13 PROCEDURE — 94760 N-INVAS EAR/PLS OXIMETRY 1: CPT

## 2020-09-13 PROCEDURE — 2580000003 HC RX 258: Performed by: NURSE PRACTITIONER

## 2020-09-13 PROCEDURE — 99239 HOSP IP/OBS DSCHRG MGMT >30: CPT | Performed by: INTERNAL MEDICINE

## 2020-09-13 PROCEDURE — 6370000000 HC RX 637 (ALT 250 FOR IP): Performed by: INTERNAL MEDICINE

## 2020-09-13 PROCEDURE — 6360000002 HC RX W HCPCS: Performed by: INTERNAL MEDICINE

## 2020-09-13 PROCEDURE — 94640 AIRWAY INHALATION TREATMENT: CPT

## 2020-09-13 PROCEDURE — 6360000002 HC RX W HCPCS: Performed by: NURSE PRACTITIONER

## 2020-09-13 PROCEDURE — 6370000000 HC RX 637 (ALT 250 FOR IP): Performed by: NURSE PRACTITIONER

## 2020-09-13 RX ORDER — DEXAMETHASONE 6 MG/1
6 TABLET ORAL DAILY
Qty: 5 TABLET | Refills: 0 | Status: SHIPPED | OUTPATIENT
Start: 2020-09-14 | End: 2020-09-19

## 2020-09-13 RX ORDER — BUDESONIDE AND FORMOTEROL FUMARATE DIHYDRATE 160; 4.5 UG/1; UG/1
2 AEROSOL RESPIRATORY (INHALATION) 2 TIMES DAILY
Qty: 1 INHALER | Refills: 3 | Status: SHIPPED | OUTPATIENT
Start: 2020-09-13

## 2020-09-13 RX ADMIN — ENOXAPARIN SODIUM 40 MG: 40 INJECTION SUBCUTANEOUS at 08:32

## 2020-09-13 RX ADMIN — ALBUTEROL SULFATE 5 MG: 2.5 SOLUTION RESPIRATORY (INHALATION) at 08:41

## 2020-09-13 RX ADMIN — ATORVASTATIN CALCIUM 10 MG: 10 TABLET, FILM COATED ORAL at 08:33

## 2020-09-13 RX ADMIN — DULOXETINE HYDROCHLORIDE 60 MG: 60 CAPSULE, DELAYED RELEASE ORAL at 08:33

## 2020-09-13 RX ADMIN — SODIUM CHLORIDE, PRESERVATIVE FREE 10 ML: 5 INJECTION INTRAVENOUS at 08:31

## 2020-09-13 RX ADMIN — BUDESONIDE AND FORMOTEROL FUMARATE DIHYDRATE 2 PUFF: 160; 4.5 AEROSOL RESPIRATORY (INHALATION) at 08:41

## 2020-09-13 RX ADMIN — DEXAMETHASONE 6 MG: 4 TABLET ORAL at 08:32

## 2020-09-13 RX ADMIN — CETIRIZINE HYDROCHLORIDE 10 MG: 10 TABLET ORAL at 08:32

## 2020-09-13 RX ADMIN — FAMOTIDINE 20 MG: 20 TABLET, FILM COATED ORAL at 08:32

## 2020-09-13 ASSESSMENT — PAIN SCALES - GENERAL
PAINLEVEL_OUTOF10: 0
PAINLEVEL_OUTOF10: 0

## 2020-09-13 NOTE — PLAN OF CARE
Problem: Impaired respiratory status  Goal: Clear lung sounds  9/13/2020 0844 by Indiana Kerr RCP  Outcome: Ongoing  Note: Pt continues on aerosol and MDI to clear lung sounds.

## 2020-09-13 NOTE — DISCHARGE SUMMARY
Hospital Medicine Discharge Summary      Patient Identification:   Navin Zimmer   : 1980  MRN: [id number]   Account: [de-identified]      Patient's PCP: JOHN Zuniga CNP    Admit Date: 9/10/2020     Discharge Date:   2020    Admitting Physician: Edgar Hernandez MD     Discharge Physician: Pecola Epley, MD     Discharge Diagnoses: Active Hospital Problems    Diagnosis Date Noted    Reactive airway disease with wheezing [J45.909]     Respiratory failure ([address] [J96.90] 2020       The patient was seen and examined on day of discharge and this discharge summary is in conjunction with any daily progress note from day of discharge. Hospital Course:   Navin Zimmer is a 38 y.o. male admitted to [facility] on 9/10/2020 for dyspnea. Oxygen saturation was 62% on 6L nasal cannula and patient was dyspneic and diaphoretic with audible bilateral wheezing. Patient was initially placed on BiPAP and later intubated in the emergency department. He received 2 g IV magnesium, 15 mg nebulizer treatment, 125 mg IV Solu-Medrol, 1 dose Zosyn, and 1 dose of vancomycin. He was admitted to the ICU for further management. On 2020, patient was extubated after doing well with spontaneous breathing trial.  Patient was continued on Decadron and breathing treatments every 4 hours while awake. Additionally, patient was continued on Symbicort daily. Patient had an increased white blood cell count and questionable pneumonia with a positive MSSA pneumonia panel. Patient received 2 days of nafcillin and was switched to Rocephin. On 2020, patient was breathing room air without any respiratory distress or dyspnea. Patient received breathing treatment in the morning and reported significant clinical improvement. Patient stated that he was ready to be discharged. Patient was discharged with 5-day course of Decadron.   Patient was also prescribed Symbicort, albuterol inhaler and nebulizer as needed. Exam:     Vitals:  Vitals:    09/12/20 2300 09/13/20 0425 09/13/20 0815 09/13/20 0841   BP: 127/67 126/83 122/81    Pulse: 68 68 67    Resp: 16 16 16    Temp: 98.6 °F (37 °C) 97.4 °F (36.3 °C) 98.1 °F (36.7 °C)    TempSrc: Oral Oral Oral    SpO2: 94% 94% 97% 96%   Weight:       Height:         Weight: Weight: 264 lb 3.2 oz (119.8 kg)     24 hour intake/output:    Intake/Output Summary (Last 24 hours) at 9/13/2020 0951  Last data filed at 9/13/2020 0425  Gross per 24 hour   Intake 170 ml   Output 500 ml   Net -330 ml         General appearance:  No apparent distress, appears stated age and cooperative. HEENT:  Normal cephalic, atraumatic without obvious deformity. Pupils equal, round, and reactive to light. Extra ocular muscles intact. Conjunctivae/corneas clear. Neck: Supple, with full range of motion. No jugular venous distention. Trachea midline. Respiratory:  Normal respiratory effort. Diffuse inspiratory wheezing. Cardiovascular:  Regular rate and rhythm with normal S1/S2 without murmurs, rubs or gallops. Abdomen: Soft, non-tender, non-distended with normal bowel sounds. Musculoskeletal:  No clubbing, cyanosis or edema bilaterally. Full range of motion without deformity. Skin: Skin color, texture, turgor normal.  No rashes or lesions. Neurologic:  Neurovascularly intact without any focal sensory/motor deficits. Psychiatric:  Alert and oriented, thought content appropriate, normal insight  Capillary Refill: Brisk,< 3 seconds   Peripheral Pulses: +2 palpable, equal bilaterally       Labs:  For convenience and continuity at follow-up the following most recent labs are provided:      CBC:    Lab Results   Component Value Date    WBC 12.8 09/12/2020    HGB 13.4 09/12/2020    HCT 42.0 09/12/2020     09/12/2020       Renal:    Lab Results   Component Value Date     09/12/2020    K 4.0 09/12/2020    K 4.1 09/10/2020     09/12/2020    CO2 26 09/12/2020    BUN 19 (PROVENTIL) (2.5 MG/3ML) 0.083% nebulizer solution  Take 2.5 mg by nebulization every 6 hours as needed for Wheezing             Albuterol Sulfate (PROAIR HFA IN)  Inhale 1 puff into the lungs as needed             atorvastatin (LIPITOR) 10 MG tablet  Take 10 mg by mouth daily             azithromycin (ZITHROMAX Z-SHAILA) 250 MG tablet  Take 250 mg by mouth daily             budesonide-formoterol (SYMBICORT) 160-4.5 MCG/ACT AERO  Inhale 2 puffs into the lungs 2 times daily             cetirizine (ZYRTEC ALLERGY) 10 MG tablet  Take 10 mg by mouth daily             dexamethasone (DECADRON) 6 MG tablet  Take 1 tablet by mouth daily for 5 days             DULoxetine HCl (CYMBALTA PO)  Take 1 tablet by mouth daily             Melatonin 10 MG CAPS  Take 2 capsules by mouth nightly             omeprazole (PRILOSEC) 40 MG delayed release capsule  Take 40 mg by mouth daily             QUEtiapine (SEROQUEL) 50 MG tablet  Take 50 mg by mouth nightly                 Time Spent on discharge is more than 30 minutes in the examination, evaluation, counseling and review of medications and discharge plan. Signed: Thank you JOHN Mccall CNP for the opportunity to be involved in this patient's care.     Electronically signed by Miah Todd MD on 9/13/2020 at 9:51 AM

## 2020-09-13 NOTE — PLAN OF CARE
Problem: Impaired respiratory status  Goal: Clear lung sounds  Outcome: Ongoing   Continue aerosol therapy as ordered to improve aeration and wheezing through out all lung fields.

## 2020-09-13 NOTE — PROGRESS NOTES
Discharge teaching and instructions for diagnosis/procedure of respiratory distress completed with patient using teachback method. AVS reviewed. Printed prescriptions given to patient. Patient voiced understanding regarding prescriptions, follow up appointments, and care of self at home. Discharged ambulatory to  home with support per wife in stable condition.

## 2020-09-13 NOTE — PLAN OF CARE
Problem: Falls - Risk of:  Goal: Will remain free from falls  Description: Will remain free from falls  Outcome: Ongoing  Note: Falling star program. Call light within reach. Side rails up x2. Encouraged to use call system. Pathway clear. Belongings in reach. Problem: Falls - Risk of:  Goal: Absence of physical injury  Description: Absence of physical injury  Outcome: Ongoing  Note: No harm to patient thus far this shift. Problem: Skin Integrity:  Goal: Will show no infection signs and symptoms  Description: Will show no infection signs and symptoms  Outcome: Ongoing  Note: Pt afebrile. Labs being monitored. Will continue to reassess. Problem: Skin Integrity:  Goal: Absence of new skin breakdown  Description: Absence of new skin breakdown  Outcome: Ongoing  Note: No new problem areas noted to skin. Repositions self throughout the night. Problem: Nutrition  Goal: Optimal nutrition therapy  Outcome: Ongoing  Note: General Diet. Problem: Discharge Planning:  Goal: Participates in care planning  Description: Participates in care planning  Outcome: Ongoing  Note: Continue to assess discharge needs as they arise. No concerns stated. Problem: Discharge Planning:  Goal: Discharged to appropriate level of care  Description: Discharged to appropriate level of care  Outcome: Ongoing  Note: Patient plans to return home with family upon discharge & [facility] Problem: Airway Clearance - Ineffective:  Goal: Ability to maintain a clear airway will improve  Description: Ability to maintain a clear airway will improve  Outcome: Ongoing  Note: Pt able to cough and clear secretions on own. Encouraged coughing and deep breathing. Breathing treatments scheduled & available PRN.       Problem: Cardiac Output - Decreased:  Goal: Hemodynamic stability will improve  Description: Hemodynamic stability will improve  Outcome: Ongoing  Note: Vitals every 4 hours & PRN. Problem: Gas Exchange - Impaired:  Goal: Levels of oxygenation will improve  Description: Levels of oxygenation will improve  Outcome: Ongoing  Note: Patient oxygen level greater than 90 on 1 liter via Nasal Cannula. Will wean when able. Will continue to reassess respiratory status. Problem: Pain:  Goal: Pain level will decrease  Description: Pain level will decrease  Outcome: Ongoing  Note: Patient free from pain this shift. Pain rated on 0-10 pain rating scale. Will continue to reassess. Problem: Pain:  Goal: Control of acute pain  Description: Control of acute pain  Outcome: Ongoing  Note: No complaints of Acute Pain. Problem: Pain:  Goal: Control of chronic pain  Description: Control of chronic pain  Outcome: Ongoing  Note: No complaints of Chronic pain. Care plan reviewed with patient. Will review and discuss care plan with family when available.

## 2020-09-13 NOTE — PLAN OF CARE
Problem: Falls - Risk of:  Goal: Will remain free from falls  Description: Will remain free from falls  9/13/2020 0943 by Luis Wheeler RN  Outcome: Ongoing  Call light within reach. Side rails up x2. Bed alarm on. Non skid slippers available. Problem: Skin Integrity:  Goal: Absence of new skin breakdown  Description: Absence of new skin breakdown  9/13/2020 0943 by Luis Wheeler RN  Outcome: Ongoing  Patient free from skin breakdown. Patient turns self and makes frequent positional changes. Will continue to monitor. Problem: Impaired respiratory status  Goal: Clear lung sounds  9/13/2020 0844 by Indiana Kerr RCP  Outcome: Ongoing  Note: Pt continues on aerosol and MDI to clear lung sounds. Patient has inspiratory/expiratory wheezes throughout the shift. Patient continues on albuterol every 4 hours while awake and as needed. Problem: Discharge Planning:  Goal: Participates in care planning  Description: Participates in care planning  9/13/2020 0943 by Luis Wheeler RN  Outcome: Ongoing  Patient plans to return home with wife when medically stable. Problem: Cardiac Output - Decreased:  Goal: Hemodynamic stability will improve  Description: Hemodynamic stability will improve  9/13/2020 0943 by Luis Wheeler RN  Outcome: Ongoing  Vitals every 4 hours and as needed. VSS throughout the shift. Problem: Gas Exchange - Impaired:  Goal: Levels of oxygenation will improve  Description: Levels of oxygenation will improve  9/13/2020 0943 by Luis Wheeler RN  Outcome: Ongoing  Patient on room air with a pulse ox >92% throughout the shift. Will continue to monitor. Problem: Pain:  Goal: Pain level will decrease  Description: Pain level will decrease  9/13/2020 0943 by Luis Wheeler RN  Outcome: Ongoing  Patient free from pain this shift. Pain rated on 0-10 pain rating scale. Will continue to reassess. Care plan reviewed with patient.   Patient verbalize understanding of the plan of

## 2020-09-14 ENCOUNTER — TELEPHONE (OUTPATIENT)
Dept: FAMILY MEDICINE CLINIC | Age: 40
End: 2020-09-14

## 2020-09-14 ENCOUNTER — PATIENT MESSAGE (OUTPATIENT)
Dept: FAMILY MEDICINE CLINIC | Age: 40
End: 2020-09-14

## 2020-09-14 LAB
GRAM STAIN RESULT: ABNORMAL
ORGANISM: ABNORMAL
RESPIRATORY CULTURE: ABNORMAL
RESPIRATORY CULTURE: ABNORMAL

## 2020-09-14 RX ORDER — METHYLPREDNISOLONE 4 MG/1
TABLET ORAL
Qty: 1 KIT | Refills: 0 | Status: SHIPPED | OUTPATIENT
Start: 2020-09-14 | End: 2020-09-20

## 2020-09-14 RX ORDER — AZITHROMYCIN 250 MG/1
250 TABLET, FILM COATED ORAL DAILY
Qty: 1 PACKET | Refills: 0 | Status: SHIPPED | OUTPATIENT
Start: 2020-09-14 | End: 2021-05-20

## 2020-09-14 RX ORDER — ALBUTEROL SULFATE 2.5 MG/3ML
2.5 SOLUTION RESPIRATORY (INHALATION) EVERY 6 HOURS PRN
Qty: 120 EACH | Refills: 2 | Status: SHIPPED | OUTPATIENT
Start: 2020-09-14 | End: 2020-12-14

## 2020-09-14 NOTE — TELEPHONE ENCOUNTER
[facility] Transitions Initial Follow Up Call    Outreach made within 2 business days of discharge: Yes    Patient: Trevon Gould Patient : 1980   MRN: [id number]  Reason for Admission: There are no discharge diagnoses documented for the most recent discharge. Discharge Date: 20       Spoke with: Patient    Discharge department/facility: [facility]    TCM Interactive Patient Contact:  Was patient able to fill all prescriptions: Yes  Was patient instructed to bring all medications to the follow-up visit: Yes  Is patient taking all medications as directed in the discharge summary? Yes  Does patient understand their discharge instructions: Yes  Does patient have questions or concerns that need addressed prior to 7-14 day follow up office visit: No- Pt states he is awaiting appt at [facility]. Scheduled appointment with PCP within 7-14 days    Follow Up  No future appointments.     [address] [place] [address]

## 2020-09-14 NOTE — PROGRESS NOTES
Spoke to wife.   I did call in emergency med to use as needed until guanako can get in to [address] clinic

## 2020-09-14 NOTE — CARE COORDINATION
9/14/20, 7:14 AM EDT    Late entry. Home with wife. Denies needs, declines HH. Patient goals/plan/ treatment preferences discussed by  and . Patient goals/plan/ treatment preferences reviewed with patient/ family. Patient/ family verbalize understanding of discharge plan and are in agreement with goal/plan/treatment preferences. Understanding was demonstrated using the teach back method. AVS provided by RN at time of discharge, which includes all necessary medical information pertaining to the patients current course of illness, treatment, post-discharge goals of care, and treatment preferences.

## 2020-09-15 LAB
COMPLEMENT TOTAL (CH50): 66.4 U/ML (ref 38.7–89.9)
MRSA SCREEN: NORMAL
NEUTROPHIL CYTOPLASMIC AB IGG: NORMAL

## 2020-09-16 LAB
ANA SCREEN: DETECTED
BLOOD CULTURE, ROUTINE: NORMAL
BLOOD CULTURE, ROUTINE: NORMAL

## 2020-09-22 RX ORDER — ATORVASTATIN CALCIUM 10 MG/1
10 TABLET, FILM COATED ORAL DAILY
Qty: 30 TABLET | Refills: 5 | Status: SHIPPED | OUTPATIENT
Start: 2020-09-22 | End: 2021-03-22

## 2020-09-22 NOTE — TELEPHONE ENCOUNTER
Jennifer Watson called requesting a refill on the following medications:  Requested Prescriptions     Pending Prescriptions Disp Refills    atorvastatin (LIPITOR) 10 MG tablet 30 tablet      Sig: Take 1 tablet by mouth daily     Pharmacy verified:  .pv  [facility] in [place]    Date of last visit: 08/20/2020  Date of next visit (if applicable): Visit date not found

## 2020-12-08 ENCOUNTER — TELEPHONE (OUTPATIENT)
Dept: ADMINISTRATIVE | Age: 40
End: 2020-12-08

## 2020-12-08 ENCOUNTER — VIRTUAL VISIT (OUTPATIENT)
Dept: FAMILY MEDICINE CLINIC | Age: 40
End: 2020-12-08
Payer: COMMERCIAL

## 2020-12-08 PROCEDURE — 99213 OFFICE O/P EST LOW 20 MIN: CPT | Performed by: NURSE PRACTITIONER

## 2020-12-08 PROCEDURE — G8428 CUR MEDS NOT DOCUMENT: HCPCS | Performed by: NURSE PRACTITIONER

## 2020-12-08 ASSESSMENT — ENCOUNTER SYMPTOMS
RESPIRATORY NEGATIVE: 1
GASTROINTESTINAL NEGATIVE: 1
EYES NEGATIVE: 1

## 2020-12-08 NOTE — PROGRESS NOTES
since quittin.9    Smokeless tobacco: Never Used   Substance Use Topics    Alcohol use: Yes    Drug use: Never        Allergies   Allergen Reactions    Cat Hair Extract     Seasonal    ,   Past Medical History:   Diagnosis Date    Anxiety     Chronic rhinosinusitis     Depression     Hypercholesteremia     Vocal cord dysfunction    ,   Past Surgical History:   Procedure Laterality Date    SINUS SURGERY  2016   ,   Social History     Tobacco Use    Smoking status: Former Smoker     Packs/day: 1.00     Years: 14.00     Pack years: 14.00     Types: Cigarettes     Start date:      Last attempt to quit:      Years since quittin.9    Smokeless tobacco: Never Used   Substance Use Topics    Alcohol use: Yes    Drug use: Never   ,   Family History   Problem Relation Age of Onset    No Known Problems Mother     No Known Problems Father    ,   Immunization History   Administered Date(s) Administered    Influenza Virus Vaccine 2020    Pneumococcal Conjugate 13-valent ([id number]) 2018    Pneumococcal Polysaccharide (Sckvdytku31) 2019    Tdap (Boostrix, Adacel) 2013       PHYSICAL EXAMINATION:  [ INSTRUCTIONS:  \"[x]\" Indicates a positive item  \"[]\" Indicates a negative item  -- DELETE ALL ITEMS NOT EXAMINED]  Vital Signs: (As obtained by patient/caregiver or practitioner observation)    Blood pressure-  Heart rate-    Respiratory rate-    Temperature-  Pulse oximetry-     Constitutional: [x] Appears well-developed and well-nourished [x] No apparent distress      [] Abnormal-   Mental status  [x] Alert and awake  [x] Oriented to person/place/time []Able to follow commands      Eyes:  EOM    [x]  Normal  [] Abnormal-  Sclera  []  Normal  [] Abnormal -         Discharge []  None visible  [] Abnormal -    HENT:   [x] Normocephalic, atraumatic.   [] Abnormal   [] Mouth/Throat: Mucous membranes are moist.     External Ears [x] Normal  [] Abnormal-     Neck: [x] No visualized mass     Pulmonary/Chest: [x] Respiratory effort normal.  [] No visualized signs of difficulty breathing or respiratory distress        [] Abnormal-      Musculoskeletal:   [x] Normal gait with no signs of ataxia         [] Normal range of motion of neck        [] Abnormal-       Neurological:        [] No Facial Asymmetry (Cranial nerve 7 motor function) (limited exam to video visit)          [] No gaze palsy        [] Abnormal-         Skin:        [] No significant exanthematous lesions or discoloration noted on facial skin         [] Abnormal-            Psychiatric:       [] Normal Affect [] No Hallucinations        [] Abnormal-     Other pertinent observable physical exam findings-     ASSESSMENT/PLAN:  1. Fever, unspecified fever cause  Will test for covid  Will notify pt of test results when they are available. If they are not notified they are to call office for the result  Monitor breathing as pt has chronic resp problems  - COVID-19 Ambulatory; Future      No follow-ups on file. Teresa Andrew is a 38 y.o. male being evaluated by a Virtual Visit (video visit) encounter to address concerns as mentioned above. A caregiver was present when appropriate. Due to this being a TeleHealth encounter (During DVDZQ-89 public health emergency), evaluation of the following organ systems was limited: Vitals/Constitutional/EENT/Resp/CV/GI//MS/Neuro/Skin/Heme-Lymph-Imm. Pursuant to the emergency declaration under the [facility], [address] [address] authority and the [employer] Act, this Virtual Visit was conducted with patient's (and/or legal guardian's) consent, to reduce the patient's risk of exposure to COVID-19 and provide necessary medical care.   The patient (and/or legal guardian) has also been advised to contact this office for worsening conditions or problems, and seek emergency medical treatment and/or call 911 if deemed necessary. Patient identification was verified at the start of the visit: Yes    Total time spent on this encounter: 15min    Services were provided through a video synchronous discussion virtually to substitute for in-person clinic visit. Patient and provider were located at their individual homes. --JOHN Eli CNP on 12/8/2020 at 5:55 PM    An electronic signature was used to authenticate this note.

## 2020-12-08 NOTE — TELEPHONE ENCOUNTER
Patient started experiencing symptoms this morning, Fever (100.9)  fatigue w/ cold symptoms. 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 Patient is requesting an order to be tested     Please call with instructions on how and where to be tested.

## 2020-12-09 ENCOUNTER — TELEPHONE (OUTPATIENT)
Dept: FAMILY MEDICINE CLINIC | Age: 40
End: 2020-12-09

## 2020-12-09 LAB — SARS-COV-2: DETECTED

## 2020-12-09 NOTE — TELEPHONE ENCOUNTER
Pt called stating when he went to [facility] for covid testing yesterday the lab said they did not have an order. They swabbed pt but said they could not run it until they had an order    Spoke with Zoe Kumar at [facility] drive thru and she states they have the order    Patient aware and voiced understanding, no concerns voiced at this time.

## 2020-12-14 RX ORDER — ALBUTEROL SULFATE 2.5 MG/3ML
SOLUTION RESPIRATORY (INHALATION)
Qty: 375 ML | Refills: 3 | Status: SHIPPED | OUTPATIENT
Start: 2020-12-14

## 2021-01-08 RX ORDER — OMEPRAZOLE 40 MG/1
40 CAPSULE, DELAYED RELEASE ORAL DAILY
Qty: 90 CAPSULE | Refills: 3 | Status: SHIPPED | OUTPATIENT
Start: 2021-01-08 | End: 2021-12-27

## 2021-01-19 ENCOUNTER — PATIENT MESSAGE (OUTPATIENT)
Dept: FAMILY MEDICINE CLINIC | Age: 41
End: 2021-01-19

## 2021-01-19 DIAGNOSIS — J31.0 CHRONIC RHINITIS: Primary | ICD-10-CM

## 2021-01-19 RX ORDER — AMOXICILLIN AND CLAVULANATE POTASSIUM 875; 125 MG/1; MG/1
1 TABLET, FILM COATED ORAL 2 TIMES DAILY
Qty: 20 TABLET | Refills: 0 | Status: SHIPPED | OUTPATIENT
Start: 2021-01-19 | End: 2021-01-29

## 2021-01-19 NOTE — TELEPHONE ENCOUNTER
From: Nicki Mae  To: , APRN - CNP  Sent: 1/19/2021 8:32 AM EST  Subject: Non-Urgent Medical Question    Hi, I have been dealing with a chronic sinus infection for a week now, and I was wondering if Chema could prescribe me an antibiotic in the hopes of getting rid of this. If he needs an appointment first that's fine too. Thanks.

## 2021-02-16 ENCOUNTER — PATIENT MESSAGE (OUTPATIENT)
Dept: FAMILY MEDICINE CLINIC | Age: 41
End: 2021-02-16

## 2021-02-16 DIAGNOSIS — R93.89 ABNORMAL CT SCAN: ICD-10-CM

## 2021-02-16 DIAGNOSIS — J32.0 CHRONIC MAXILLARY SINUSITIS: ICD-10-CM

## 2021-02-16 DIAGNOSIS — D72.18 EOSINOPHILIC GRANULOMATOSIS WITH POLYANGIITIS (EGPA) (HCC): Primary | ICD-10-CM

## 2021-02-16 DIAGNOSIS — M30.1 EOSINOPHILIC GRANULOMATOSIS WITH POLYANGIITIS (EGPA) (HCC): Primary | ICD-10-CM

## 2021-02-16 RX ORDER — FLUTICASONE PROPIONATE 50 MCG
2 SPRAY, SUSPENSION (ML) NASAL DAILY
Qty: 1 BOTTLE | Refills: 1 | Status: SHIPPED | OUTPATIENT
Start: 2021-02-16 | End: 2022-03-09

## 2021-02-16 RX ORDER — IPRATROPIUM BROMIDE 21 UG/1
2 SPRAY, METERED NASAL EVERY 12 HOURS
Qty: 1 BOTTLE | Refills: 3 | Status: SHIPPED | OUTPATIENT
Start: 2021-02-16 | End: 2022-03-09

## 2021-02-16 NOTE — TELEPHONE ENCOUNTER
From: Conrad Mayorga  To: Carmen Gunn, APRN - CNP  Sent: 2/16/2021 12:10 PM EST  Subject: Non-Urgent Medical Question    Kahlil Bear, in regards to the message I sent earlier, attached are some requests from [facility] for some tests. Please let me know if you need further info. Thanks!     Robert Cano

## 2021-02-22 ENCOUNTER — HOSPITAL ENCOUNTER (OUTPATIENT)
Dept: CT IMAGING | Age: 41
Discharge: HOME | End: 2021-02-22
Payer: COMMERCIAL

## 2021-02-22 DIAGNOSIS — M30.1 EOSINOPHILIC GRANULOMATOSIS WITH POLYANGIITIS (EGPA) (HCC): ICD-10-CM

## 2021-02-22 DIAGNOSIS — D72.18 EOSINOPHILIC GRANULOMATOSIS WITH POLYANGIITIS (EGPA) (HCC): ICD-10-CM

## 2021-02-22 DIAGNOSIS — R93.89 ABNORMAL CT SCAN: ICD-10-CM

## 2021-02-22 DIAGNOSIS — J32.0 CHRONIC MAXILLARY SINUSITIS: ICD-10-CM

## 2021-02-22 PROCEDURE — 71250 CT THORAX DX C-: CPT

## 2021-02-23 ENCOUNTER — TELEPHONE (OUTPATIENT)
Dept: FAMILY MEDICINE CLINIC | Age: 41
End: 2021-02-23

## 2021-02-23 NOTE — TELEPHONE ENCOUNTER
[facility] called asking for imaging to be sent to them for patient. Tried to call back at [phone] opt 3 to see what imaging they are needing. Office is closed for the day.

## 2021-02-24 NOTE — TELEPHONE ENCOUNTER
Requesting physical CD either overnight Fedex or electronically of the images. CT Scan that was completed 9/8/2020. Address:  [facility]  Dr. Milli Cornell  [address] [place] [address],[address] [facility]

## 2021-03-03 ENCOUNTER — NURSE ONLY (OUTPATIENT)
Dept: LAB | Age: 41
End: 2021-03-03

## 2021-03-03 DIAGNOSIS — D72.18 EOSINOPHILIC GRANULOMATOSIS WITH POLYANGIITIS (EGPA) (HCC): ICD-10-CM

## 2021-03-03 DIAGNOSIS — J32.0 CHRONIC MAXILLARY SINUSITIS: ICD-10-CM

## 2021-03-03 DIAGNOSIS — M30.1 EOSINOPHILIC GRANULOMATOSIS WITH POLYANGIITIS (EGPA) (HCC): ICD-10-CM

## 2021-03-03 DIAGNOSIS — R93.89 ABNORMAL CT SCAN: ICD-10-CM

## 2021-03-04 LAB
ALBUMIN SERPL-MCNC: 4.5 G/DL (ref 3.5–5.1)
ALP BLD-CCNC: 71 U/L (ref 38–126)
ALT SERPL-CCNC: 23 U/L (ref 11–66)
ANION GAP SERPL CALCULATED.3IONS-SCNC: 11 MEQ/L (ref 8–16)
AST SERPL-CCNC: 28 U/L (ref 5–40)
BASOPHILS # BLD: 0.9 %
BASOPHILS ABSOLUTE: 0 THOU/MM3 (ref 0–0.1)
BILIRUB SERPL-MCNC: 0.6 MG/DL (ref 0.3–1.2)
BILIRUBIN URINE: NEGATIVE
BLOOD, URINE: NEGATIVE
BUN BLDV-MCNC: 14 MG/DL (ref 7–22)
C-REACTIVE PROTEIN: 0.79 MG/DL (ref 0–1)
CALCIUM SERPL-MCNC: 9.7 MG/DL (ref 8.5–10.5)
CHARACTER, URINE: CLEAR
CHLORIDE BLD-SCNC: 105 MEQ/L (ref 98–111)
CO2: 25 MEQ/L (ref 23–33)
COLOR: YELLOW
CREAT SERPL-MCNC: 1.3 MG/DL (ref 0.4–1.2)
EOSINOPHIL # BLD: 1.3 %
EOSINOPHILS ABSOLUTE: 0.1 THOU/MM3 (ref 0–0.4)
ERYTHROCYTE [DISTWIDTH] IN BLOOD BY AUTOMATED COUNT: 13.1 % (ref 11.5–14.5)
ERYTHROCYTE [DISTWIDTH] IN BLOOD BY AUTOMATED COUNT: 41 FL (ref 35–45)
GFR SERPL CREATININE-BSD FRML MDRD: 61 ML/MIN/1.73M2
GLUCOSE BLD-MCNC: 125 MG/DL (ref 70–108)
GLUCOSE, URINE: NEGATIVE MG/DL
HCT VFR BLD CALC: 47.7 % (ref 42–52)
HEMOGLOBIN: 15.1 GM/DL (ref 14–18)
IMMATURE GRANS (ABS): 0.03 THOU/MM3 (ref 0–0.07)
IMMATURE GRANULOCYTES: 0.6 %
KETONES, URINE: NEGATIVE
LEUKOCYTE EST, POC: NEGATIVE
LYMPHOCYTES # BLD: 16.5 %
LYMPHOCYTES ABSOLUTE: 0.8 THOU/MM3 (ref 1–4.8)
MCH RBC QN AUTO: 27.7 PG (ref 26–33)
MCHC RBC AUTO-ENTMCNC: 31.7 GM/DL (ref 32.2–35.5)
MCV RBC AUTO: 87.4 FL (ref 80–94)
MONOCYTES # BLD: 6.7 %
MONOCYTES ABSOLUTE: 0.3 THOU/MM3 (ref 0.4–1.3)
NITRITE, URINE: NEGATIVE
NUCLEATED RED BLOOD CELLS: 0 /100 WBC
PH UA: 5.5 (ref 5–9)
PLATELET # BLD: 271 THOU/MM3 (ref 130–400)
PMV BLD AUTO: 12.3 FL (ref 9.4–12.4)
POTASSIUM SERPL-SCNC: 4.5 MEQ/L (ref 3.5–5.2)
PROTEIN UA: NEGATIVE MG/DL
RBC # BLD: 5.46 MILL/MM3 (ref 4.7–6.1)
SEDIMENTATION RATE, ERYTHROCYTE: 6 MM/HR (ref 0–10)
SEG NEUTROPHILS: 74 %
SEGMENTED NEUTROPHILS ABSOLUTE COUNT: 3.4 THOU/MM3 (ref 1.8–7.7)
SODIUM BLD-SCNC: 141 MEQ/L (ref 135–145)
SPECIFIC GRAVITY UA: 1.01 (ref 1–1.03)
TOTAL PROTEIN: 7.3 G/DL (ref 6.1–8)
UROBILINOGEN, URINE: 0.2 EU/DL (ref 0–1)
WBC # BLD: 4.6 THOU/MM3 (ref 4.8–10.8)

## 2021-03-22 RX ORDER — ATORVASTATIN CALCIUM 10 MG/1
TABLET, FILM COATED ORAL
Qty: 30 TABLET | Refills: 5 | Status: SHIPPED | OUTPATIENT
Start: 2021-03-22 | End: 2022-03-09

## 2021-04-29 ENCOUNTER — HOSPITAL ENCOUNTER (OUTPATIENT)
Age: 41
Discharge: HOME | End: 2021-04-29
Payer: COMMERCIAL

## 2021-04-29 LAB
BASOPHILS # BLD: 1.1 %
BASOPHILS ABSOLUTE: 0.1 THOU/MM3 (ref 0–0.1)
BILIRUBIN URINE: NEGATIVE
BLOOD, URINE: NEGATIVE
CHARACTER, URINE: CLEAR
COLOR: YELLOW
EOSINOPHIL # BLD: 1.5 %
EOSINOPHILS ABSOLUTE: 0.1 THOU/MM3 (ref 0–0.4)
ERYTHROCYTE [DISTWIDTH] IN BLOOD BY AUTOMATED COUNT: 13.1 % (ref 11.5–14.5)
ERYTHROCYTE [DISTWIDTH] IN BLOOD BY AUTOMATED COUNT: 40.5 FL (ref 35–45)
GLUCOSE, URINE: NEGATIVE MG/DL
HCT VFR BLD CALC: 49 % (ref 42–52)
HEMOGLOBIN: 15.2 GM/DL (ref 14–18)
IMMATURE GRANS (ABS): 0.02 THOU/MM3 (ref 0–0.07)
IMMATURE GRANULOCYTES: 0.4 %
KETONES, URINE: NEGATIVE
LEUKOCYTE EST, POC: NEGATIVE
LYMPHOCYTES # BLD: 25.3 %
LYMPHOCYTES ABSOLUTE: 1.2 THOU/MM3 (ref 1–4.8)
MCH RBC QN AUTO: 26.9 PG (ref 26–33)
MCHC RBC AUTO-ENTMCNC: 31 GM/DL (ref 32.2–35.5)
MCV RBC AUTO: 86.7 FL (ref 80–94)
MONOCYTES # BLD: 8.4 %
MONOCYTES ABSOLUTE: 0.4 THOU/MM3 (ref 0.4–1.3)
NITRITE, URINE: NEGATIVE
NUCLEATED RED BLOOD CELLS: 0 /100 WBC
PH UA: 7.5 (ref 5–9)
PLATELET # BLD: 150 THOU/MM3 (ref 130–400)
PMV BLD AUTO: 12.3 FL (ref 9.4–12.4)
PROTEIN UA: NEGATIVE MG/DL
RBC # BLD: 5.65 MILL/MM3 (ref 4.7–6.1)
SEDIMENTATION RATE, ERYTHROCYTE: 2 MM/HR (ref 0–10)
SEG NEUTROPHILS: 63.3 %
SEGMENTED NEUTROPHILS ABSOLUTE COUNT: 3 THOU/MM3 (ref 1.8–7.7)
SPECIFIC GRAVITY UA: 1.01 (ref 1–1.03)
UROBILINOGEN, URINE: 0.2 EU/DL (ref 0–1)
WBC # BLD: 4.8 THOU/MM3 (ref 4.8–10.8)

## 2021-04-29 PROCEDURE — 81003 URINALYSIS AUTO W/O SCOPE: CPT

## 2021-04-29 PROCEDURE — 36415 COLL VENOUS BLD VENIPUNCTURE: CPT

## 2021-04-29 PROCEDURE — 80053 COMPREHEN METABOLIC PANEL: CPT

## 2021-04-29 PROCEDURE — 85651 RBC SED RATE NONAUTOMATED: CPT

## 2021-04-29 PROCEDURE — 85025 COMPLETE CBC W/AUTO DIFF WBC: CPT

## 2021-04-29 PROCEDURE — 86140 C-REACTIVE PROTEIN: CPT

## 2021-04-30 LAB
ALBUMIN SERPL-MCNC: 4.5 G/DL (ref 3.5–5.1)
ALP BLD-CCNC: 61 U/L (ref 38–126)
ALT SERPL-CCNC: 19 U/L (ref 11–66)
ANION GAP SERPL CALCULATED.3IONS-SCNC: 11 MEQ/L (ref 8–16)
AST SERPL-CCNC: 26 U/L (ref 5–40)
BILIRUB SERPL-MCNC: 0.8 MG/DL (ref 0.3–1.2)
BUN BLDV-MCNC: 14 MG/DL (ref 7–22)
C-REACTIVE PROTEIN: < 0.3 MG/DL (ref 0–1)
CALCIUM SERPL-MCNC: 9.2 MG/DL (ref 8.5–10.5)
CHLORIDE BLD-SCNC: 105 MEQ/L (ref 98–111)
CO2: 27 MEQ/L (ref 23–33)
CREAT SERPL-MCNC: 1.1 MG/DL (ref 0.4–1.2)
GFR SERPL CREATININE-BSD FRML MDRD: 73 ML/MIN/1.73M2
GLUCOSE BLD-MCNC: 95 MG/DL (ref 70–108)
POTASSIUM SERPL-SCNC: 4.3 MEQ/L (ref 3.5–5.2)
SODIUM BLD-SCNC: 143 MEQ/L (ref 135–145)
TOTAL PROTEIN: 6.8 G/DL (ref 6.1–8)

## 2021-05-05 ENCOUNTER — HOSPITAL ENCOUNTER (OUTPATIENT)
Age: 41
Discharge: HOME | End: 2021-05-05
Payer: COMMERCIAL

## 2021-05-05 PROCEDURE — 81335 TPMT GENE COM VARIANTS: CPT

## 2021-05-05 PROCEDURE — 81306 NUDT15 GENE COMMON VARIANTS: CPT

## 2021-05-16 LAB — MISC. #1 REFERENCE GROUP TEST: NORMAL

## 2021-05-20 ENCOUNTER — OFFICE VISIT (OUTPATIENT)
Dept: FAMILY MEDICINE CLINIC | Age: 41
End: 2021-05-20
Payer: COMMERCIAL

## 2021-05-20 VITALS
SYSTOLIC BLOOD PRESSURE: 122 MMHG | OXYGEN SATURATION: 98 % | BODY MASS INDEX: 34.85 KG/M2 | WEIGHT: 263 LBS | HEIGHT: 73 IN | DIASTOLIC BLOOD PRESSURE: 66 MMHG | HEART RATE: 67 BPM | RESPIRATION RATE: 18 BRPM

## 2021-05-20 DIAGNOSIS — M77.11 LATERAL EPICONDYLITIS OF RIGHT ELBOW: Primary | ICD-10-CM

## 2021-05-20 PROCEDURE — G8427 DOCREV CUR MEDS BY ELIG CLIN: HCPCS | Performed by: NURSE PRACTITIONER

## 2021-05-20 PROCEDURE — G8417 CALC BMI ABV UP PARAM F/U: HCPCS | Performed by: NURSE PRACTITIONER

## 2021-05-20 PROCEDURE — 99213 OFFICE O/P EST LOW 20 MIN: CPT | Performed by: NURSE PRACTITIONER

## 2021-05-20 PROCEDURE — 1036F TOBACCO NON-USER: CPT | Performed by: NURSE PRACTITIONER

## 2021-05-20 RX ORDER — PREDNISONE 1 MG/1
TABLET ORAL
Qty: 55 TABLET | Refills: 0 | Status: SHIPPED | OUTPATIENT
Start: 2021-05-20 | End: 2022-01-11 | Stop reason: SDUPTHER

## 2021-05-20 ASSESSMENT — ENCOUNTER SYMPTOMS
GASTROINTESTINAL NEGATIVE: 1
EYES NEGATIVE: 1
RESPIRATORY NEGATIVE: 1

## 2021-05-20 NOTE — PROGRESS NOTES
Governthalia Foster is a 42 y.o. male whopresents today for :  Chief Complaint   Patient presents with    Elbow Pain     Rt side; no injury; slowly getting worse        HPI:     HPI  Pt reports starting a month ago with right elbow pain. No trauma. Has progressively worsened. Very pain to lift. The pain is affecting his  and elbow flexion.       Patient Active Problem List   Diagnosis    Respiratory failure ([address]    Reactive airway disease with wheezing        Past Medical History:   Diagnosis Date    Anxiety     Chronic rhinosinusitis     Depression     Hypercholesteremia     Vocal cord dysfunction       Past Surgical History:   Procedure Laterality Date    SINUS SURGERY  2016     Family History   Problem Relation Age of Onset    No Known Problems Mother     No Known Problems Father      Social History     Tobacco Use    Smoking status: Former Smoker     Packs/day: 1.00     Years: 14.00     Pack years: 14.00     Types: Cigarettes     Start date:      Quit date:      Years since quittin.3    Smokeless tobacco: Never Used   Substance Use Topics    Alcohol use: Yes      Current Outpatient Medications   Medication Sig Dispense Refill    predniSONE (DELTASONE) 5 MG tablet 10 tabs po day 1 taper by 5mg daily 55 tablet 0    atorvastatin (LIPITOR) 10 MG tablet take 1 tablet by mouth once daily 30 tablet 5    fluticasone (FLONASE) 50 MCG/ACT nasal spray 2 sprays by Nasal route daily 1 Bottle 1    ipratropium (ATROVENT) 0.03 % nasal spray 2 sprays by Each Nostril route every 12 hours 1 Bottle 3    omeprazole (PRILOSEC) 40 MG delayed release capsule Take 1 capsule by mouth daily 90 capsule 3    albuterol (PROVENTIL) (2.5 MG/3ML) 0.083% nebulizer solution inhale contents of 1 vial ( 3 milliliters ) in nebulizer by mouth and INTO THE LUNGS every 6 hours if needed for wheezing 375 mL 3    budesonide-formoterol (SYMBICORT) 160-4.5 MCG/ACT AERO Inhale 2 puffs into the lungs 2 times daily 1 Inhaler 3    cetirizine (ZYRTEC ALLERGY) 10 MG tablet Take 10 mg by mouth daily      DULoxetine HCl (CYMBALTA PO) Take 1 tablet by mouth daily      Melatonin 10 MG CAPS Take 2 capsules by mouth nightly      QUEtiapine (SEROQUEL) 50 MG tablet Take 50 mg by mouth nightly      Albuterol Sulfate (PROAIR HFA IN) Inhale 1 puff into the lungs as needed       No current facility-administered medications for this visit. Allergies   Allergen Reactions    Cat Hair Extract     Seasonal      Health Maintenance   Topic Date Due    Hepatitis C screen  Never done    Lipid screen  Never done    COVID-19 Vaccine (1) Never done    HIV screen  Never done    Flu vaccine (Season Ended) 09/01/2021    DTaP/Tdap/Td vaccine (2 - Td) 12/17/2023    Hepatitis A vaccine  Aged Out    Hepatitis B vaccine  Aged Out    Hib vaccine  Aged Out    Meningococcal (ACWY) vaccine  Aged Out    Pneumococcal 0-64 years Vaccine  Aged Out       Subjective:     Review of Systems   Constitutional: Negative. HENT: Negative. Eyes: Negative. Respiratory: Negative. Cardiovascular: Negative. Gastrointestinal: Negative. Musculoskeletal: Positive for arthralgias and myalgias. Skin: Negative. Neurological: Negative. Objective:     Vitals:    05/20/21 1132   BP: 122/66   Site: Left Upper Arm   Position: Sitting   Cuff Size: Medium Adult   Pulse: 67   Resp: 18   SpO2: 98%   Weight: 263 lb (119.3 kg)   Height: 6' 1\" (1.854 m)       Physical Exam  Constitutional:       Appearance: He is well-developed. HENT:      Head: Normocephalic. Right Ear: Tympanic membrane and external ear normal.      Left Ear: Tympanic membrane and external ear normal.      Nose: Nose normal.   Cardiovascular:      Rate and Rhythm: Normal rate and regular rhythm. Heart sounds: Normal heart sounds. No murmur heard. No friction rub. No gallop. Pulmonary:      Effort: Pulmonary effort is normal.      Breath sounds: Normal breath sounds. No wheezing or rales. Abdominal:      General: Bowel sounds are normal.      Palpations: Abdomen is soft. Tenderness: There is no abdominal tenderness. There is no guarding. Musculoskeletal:         General: Normal range of motion. Right elbow: Tenderness present in lateral epicondyle. Cervical back: Normal range of motion and neck supple. Lymphadenopathy:      Cervical: No cervical adenopathy. Skin:     General: Skin is warm. Neurological:      Mental Status: He is alert and oriented to person, place, and time. Deep Tendon Reflexes: Reflexes are normal and symmetric. Assessment:      Diagnosis Orders   1. Lateral epicondylitis of right elbow  predniSONE (DELTASONE) 5 MG tablet       Plan:      No follow-ups on file. No orders of the defined types were placed in this encounter. Orders Placed This Encounter   Medications    predniSONE (DELTASONE) 5 MG tablet     Sig: 10 tabs po day 1 taper by 5mg daily     Dispense:  55 tablet     Refill:  0      Recommend tennis elbow brace, rest and prednisone. If not better in 3 weeks call office and will arrange for consultation    Patient given educational materials - seepatient instructions. Discussed use, benefit, and side effects of prescribed medications. All patient questions answered. Pt voiced understanding. Patient agreed withtreatment plan. Follow up as directed.      Electronically signed by Jonothan Fleischer, APRN - CNP on 5/20/2021 at 5:20 PM

## 2021-06-11 ENCOUNTER — HOSPITAL ENCOUNTER (OUTPATIENT)
Age: 41
Discharge: HOME | End: 2021-06-11
Payer: COMMERCIAL

## 2021-06-11 LAB
ALBUMIN SERPL-MCNC: 4.8 G/DL (ref 3.5–5.1)
ALP BLD-CCNC: 72 U/L (ref 38–126)
ALT SERPL-CCNC: 23 U/L (ref 11–66)
AMORPHOUS: NORMAL
ANION GAP SERPL CALCULATED.3IONS-SCNC: 13 MEQ/L (ref 8–16)
AST SERPL-CCNC: 32 U/L (ref 5–40)
BACTERIA: NORMAL
BASOPHILS # BLD: 0.6 %
BASOPHILS ABSOLUTE: 0 THOU/MM3 (ref 0–0.1)
BILIRUB SERPL-MCNC: 1.5 MG/DL (ref 0.3–1.2)
BILIRUBIN URINE: ABNORMAL
BLOOD, URINE: NEGATIVE
BUN BLDV-MCNC: 16 MG/DL (ref 7–22)
C-REACTIVE PROTEIN: 0.9 MG/DL (ref 0–1)
CALCIUM SERPL-MCNC: 9.5 MG/DL (ref 8.5–10.5)
CASTS UA: NORMAL /LPF
CHARACTER, URINE: ABNORMAL
CHLORIDE BLD-SCNC: 104 MEQ/L (ref 98–111)
CO2: 23 MEQ/L (ref 23–33)
COLOR: ABNORMAL
CREAT SERPL-MCNC: 1.2 MG/DL (ref 0.4–1.2)
CRYSTALS, UA: NORMAL
EOSINOPHIL # BLD: 0.8 %
EOSINOPHILS ABSOLUTE: 0.1 THOU/MM3 (ref 0–0.4)
EPITHELIAL CELLS, UA: NORMAL /HPF
ERYTHROCYTE [DISTWIDTH] IN BLOOD BY AUTOMATED COUNT: 13.9 % (ref 11.5–14.5)
ERYTHROCYTE [DISTWIDTH] IN BLOOD BY AUTOMATED COUNT: 42.8 FL (ref 35–45)
GFR SERPL CREATININE-BSD FRML MDRD: 66 ML/MIN/1.73M2
GLUCOSE BLD-MCNC: 118 MG/DL (ref 70–108)
GLUCOSE, URINE: NEGATIVE MG/DL
HCT VFR BLD CALC: 49.9 % (ref 42–52)
HEMOGLOBIN: 15.9 GM/DL (ref 14–18)
ICTOTEST: NEGATIVE
IMMATURE GRANS (ABS): 0.02 THOU/MM3 (ref 0–0.07)
IMMATURE GRANULOCYTES: 0.3 %
KETONES, URINE: ABNORMAL
LEUKOCYTE ESTERASE, URINE: NEGATIVE
LYMPHOCYTES # BLD: 14.9 %
LYMPHOCYTES ABSOLUTE: 1.2 THOU/MM3 (ref 1–4.8)
MCH RBC QN AUTO: 27.1 PG (ref 26–33)
MCHC RBC AUTO-ENTMCNC: 31.9 GM/DL (ref 32.2–35.5)
MCV RBC AUTO: 85 FL (ref 80–94)
MONOCYTES # BLD: 7.8 %
MONOCYTES ABSOLUTE: 0.6 THOU/MM3 (ref 0.4–1.3)
MUCUS: NORMAL
NITRITE, URINE: NEGATIVE
NUCLEATED RED BLOOD CELLS: 0 /100 WBC
PH UA: 5.5 (ref 5–9)
PLATELET # BLD: 187 THOU/MM3 (ref 130–400)
PMV BLD AUTO: 12.1 FL (ref 9.4–12.4)
POTASSIUM SERPL-SCNC: 4.2 MEQ/L (ref 3.5–5.2)
PROTEIN UA: 30 MG/DL
RBC # BLD: 5.87 MILL/MM3 (ref 4.7–6.1)
RBC URINE: NORMAL /HPF
SEDIMENTATION RATE, ERYTHROCYTE: 7 MM/HR (ref 0–10)
SEG NEUTROPHILS: 75.6 %
SEGMENTED NEUTROPHILS ABSOLUTE COUNT: 6 THOU/MM3 (ref 1.8–7.7)
SODIUM BLD-SCNC: 140 MEQ/L (ref 135–145)
SPECIFIC GRAVITY UA: >= 1.03 (ref 1–1.03)
TOTAL PROTEIN: 7.5 G/DL (ref 6.1–8)
UROBILINOGEN, URINE: 1 EU/DL (ref 0.2–1)
WBC # BLD: 8 THOU/MM3 (ref 4.8–10.8)
WBC UA: NORMAL /HPF

## 2021-06-11 PROCEDURE — 81001 URINALYSIS AUTO W/SCOPE: CPT

## 2021-06-11 PROCEDURE — 80053 COMPREHEN METABOLIC PANEL: CPT

## 2021-06-11 PROCEDURE — 81003 URINALYSIS AUTO W/O SCOPE: CPT

## 2021-06-11 PROCEDURE — 85651 RBC SED RATE NONAUTOMATED: CPT

## 2021-06-11 PROCEDURE — 36415 COLL VENOUS BLD VENIPUNCTURE: CPT

## 2021-06-11 PROCEDURE — 85025 COMPLETE CBC W/AUTO DIFF WBC: CPT

## 2021-06-11 PROCEDURE — 86140 C-REACTIVE PROTEIN: CPT

## 2021-08-17 ENCOUNTER — HOSPITAL ENCOUNTER (OUTPATIENT)
Age: 41
Discharge: HOME | End: 2021-08-17
Payer: COMMERCIAL

## 2021-08-17 LAB
ALBUMIN SERPL-MCNC: 4.4 G/DL (ref 3.5–5.1)
ALP BLD-CCNC: 80 U/L (ref 38–126)
ALT SERPL-CCNC: 19 U/L (ref 11–66)
ANION GAP SERPL CALCULATED.3IONS-SCNC: 9 MEQ/L (ref 8–16)
AST SERPL-CCNC: 27 U/L (ref 5–40)
BASOPHILS # BLD: 1.2 %
BASOPHILS ABSOLUTE: 0.1 THOU/MM3 (ref 0–0.1)
BILIRUB SERPL-MCNC: 0.4 MG/DL (ref 0.3–1.2)
BUN BLDV-MCNC: 20 MG/DL (ref 7–22)
C-REACTIVE PROTEIN: < 0.3 MG/DL (ref 0–1)
CALCIUM SERPL-MCNC: 9.5 MG/DL (ref 8.5–10.5)
CHLORIDE BLD-SCNC: 104 MEQ/L (ref 98–111)
CO2: 28 MEQ/L (ref 23–33)
CREAT SERPL-MCNC: 1.3 MG/DL (ref 0.4–1.2)
EOSINOPHIL # BLD: 1.8 %
EOSINOPHILS ABSOLUTE: 0.1 THOU/MM3 (ref 0–0.4)
ERYTHROCYTE [DISTWIDTH] IN BLOOD BY AUTOMATED COUNT: 13.6 % (ref 11.5–14.5)
ERYTHROCYTE [DISTWIDTH] IN BLOOD BY AUTOMATED COUNT: 44.1 FL (ref 35–45)
GFR SERPL CREATININE-BSD FRML MDRD: 60 ML/MIN/1.73M2
GLUCOSE BLD-MCNC: 91 MG/DL (ref 70–108)
HCT VFR BLD CALC: 47.9 % (ref 42–52)
HEMOGLOBIN: 15.2 GM/DL (ref 14–18)
IMMATURE GRANS (ABS): 0.03 THOU/MM3 (ref 0–0.07)
IMMATURE GRANULOCYTES: 0.6 %
LYMPHOCYTES # BLD: 26 %
LYMPHOCYTES ABSOLUTE: 1.3 THOU/MM3 (ref 1–4.8)
MCH RBC QN AUTO: 28.4 PG (ref 26–33)
MCHC RBC AUTO-ENTMCNC: 31.7 GM/DL (ref 32.2–35.5)
MCV RBC AUTO: 89.5 FL (ref 80–94)
MONOCYTES # BLD: 8.9 %
MONOCYTES ABSOLUTE: 0.4 THOU/MM3 (ref 0.4–1.3)
NUCLEATED RED BLOOD CELLS: 0 /100 WBC
PLATELET # BLD: 216 THOU/MM3 (ref 130–400)
PMV BLD AUTO: 12.5 FL (ref 9.4–12.4)
POTASSIUM SERPL-SCNC: 4.9 MEQ/L (ref 3.5–5.2)
RBC # BLD: 5.35 MILL/MM3 (ref 4.7–6.1)
SEDIMENTATION RATE, ERYTHROCYTE: 4 MM/HR (ref 0–10)
SEG NEUTROPHILS: 61.5 %
SEGMENTED NEUTROPHILS ABSOLUTE COUNT: 3.1 THOU/MM3 (ref 1.8–7.7)
SODIUM BLD-SCNC: 141 MEQ/L (ref 135–145)
TOTAL PROTEIN: 6.9 G/DL (ref 6.1–8)
WBC # BLD: 5 THOU/MM3 (ref 4.8–10.8)

## 2021-08-17 PROCEDURE — 80053 COMPREHEN METABOLIC PANEL: CPT

## 2021-08-17 PROCEDURE — 86140 C-REACTIVE PROTEIN: CPT

## 2021-08-17 PROCEDURE — 85651 RBC SED RATE NONAUTOMATED: CPT

## 2021-08-17 PROCEDURE — 85025 COMPLETE CBC W/AUTO DIFF WBC: CPT

## 2021-08-17 PROCEDURE — 36415 COLL VENOUS BLD VENIPUNCTURE: CPT

## 2021-08-17 PROCEDURE — 81003 URINALYSIS AUTO W/O SCOPE: CPT

## 2021-08-18 LAB
BILIRUBIN URINE: NEGATIVE
BLOOD, URINE: NEGATIVE
CHARACTER, URINE: CLEAR
COLOR: YELLOW
GLUCOSE, URINE: NEGATIVE MG/DL
KETONES, URINE: NEGATIVE
LEUKOCYTE EST, POC: NEGATIVE
NITRITE, URINE: NEGATIVE
PH UA: 5.5 (ref 5–9)
PROTEIN UA: NEGATIVE MG/DL
SPECIFIC GRAVITY UA: 1.02 (ref 1–1.03)
UROBILINOGEN, URINE: 0.2 EU/DL (ref 0–1)

## 2021-09-13 ENCOUNTER — HOSPITAL ENCOUNTER (OUTPATIENT)
Age: 41
Discharge: HOME | End: 2021-09-13
Payer: COMMERCIAL

## 2021-09-13 LAB
BASOPHILS # BLD: 1.1 %
BASOPHILS ABSOLUTE: 0.1 THOU/MM3 (ref 0–0.1)
BILIRUBIN URINE: NEGATIVE
BLOOD, URINE: NEGATIVE
CHARACTER, URINE: CLEAR
COLOR: YELLOW
EOSINOPHIL # BLD: 0.7 %
EOSINOPHILS ABSOLUTE: 0 THOU/MM3 (ref 0–0.4)
ERYTHROCYTE [DISTWIDTH] IN BLOOD BY AUTOMATED COUNT: 13.5 % (ref 11.5–14.5)
ERYTHROCYTE [DISTWIDTH] IN BLOOD BY AUTOMATED COUNT: 44.1 FL (ref 35–45)
GLUCOSE, URINE: NEGATIVE MG/DL
HCT VFR BLD CALC: 45.1 % (ref 42–52)
HEMOGLOBIN: 14.5 GM/DL (ref 14–18)
IMMATURE GRANS (ABS): 0.03 THOU/MM3 (ref 0–0.07)
IMMATURE GRANULOCYTES: 0.5 %
KETONES, URINE: ABNORMAL
LEUKOCYTE ESTERASE, URINE: NEGATIVE
LYMPHOCYTES # BLD: 11.7 %
LYMPHOCYTES ABSOLUTE: 0.7 THOU/MM3 (ref 1–4.8)
MCH RBC QN AUTO: 29 PG (ref 26–33)
MCHC RBC AUTO-ENTMCNC: 32.2 GM/DL (ref 32.2–35.5)
MCV RBC AUTO: 90.2 FL (ref 80–94)
MONOCYTES # BLD: 4.6 %
MONOCYTES ABSOLUTE: 0.3 THOU/MM3 (ref 0.4–1.3)
NITRITE, URINE: NEGATIVE
NUCLEATED RED BLOOD CELLS: 0 /100 WBC
PH UA: 6 (ref 5–9)
PLATELET # BLD: 227 THOU/MM3 (ref 130–400)
PMV BLD AUTO: 11.8 FL (ref 9.4–12.4)
PROTEIN UA: NEGATIVE MG/DL
RBC # BLD: 5 MILL/MM3 (ref 4.7–6.1)
SEDIMENTATION RATE, ERYTHROCYTE: 3 MM/HR (ref 0–10)
SEG NEUTROPHILS: 81.4 %
SEGMENTED NEUTROPHILS ABSOLUTE COUNT: 4.6 THOU/MM3 (ref 1.8–7.7)
SPECIFIC GRAVITY UA: 1.02 (ref 1–1.03)
UROBILINOGEN, URINE: 0.2 EU/DL (ref 0.2–1)
WBC # BLD: 5.6 THOU/MM3 (ref 4.8–10.8)

## 2021-09-13 PROCEDURE — 80053 COMPREHEN METABOLIC PANEL: CPT

## 2021-09-13 PROCEDURE — 85025 COMPLETE CBC W/AUTO DIFF WBC: CPT

## 2021-09-13 PROCEDURE — 86140 C-REACTIVE PROTEIN: CPT

## 2021-09-13 PROCEDURE — 85651 RBC SED RATE NONAUTOMATED: CPT

## 2021-09-13 PROCEDURE — 81003 URINALYSIS AUTO W/O SCOPE: CPT

## 2021-09-13 PROCEDURE — 36415 COLL VENOUS BLD VENIPUNCTURE: CPT

## 2021-09-14 LAB
ALBUMIN SERPL-MCNC: 4.6 G/DL (ref 3.5–5.1)
ALP BLD-CCNC: 85 U/L (ref 38–126)
ALT SERPL-CCNC: 32 U/L (ref 11–66)
ANION GAP SERPL CALCULATED.3IONS-SCNC: 9 MEQ/L (ref 8–16)
AST SERPL-CCNC: 34 U/L (ref 5–40)
BILIRUB SERPL-MCNC: 0.5 MG/DL (ref 0.3–1.2)
BUN BLDV-MCNC: 20 MG/DL (ref 7–22)
C-REACTIVE PROTEIN: < 0.3 MG/DL (ref 0–1)
CALCIUM SERPL-MCNC: 9.3 MG/DL (ref 8.5–10.5)
CHLORIDE BLD-SCNC: 103 MEQ/L (ref 98–111)
CO2: 25 MEQ/L (ref 23–33)
CREAT SERPL-MCNC: 1.2 MG/DL (ref 0.4–1.2)
GFR SERPL CREATININE-BSD FRML MDRD: 66 ML/MIN/1.73M2
GLUCOSE BLD-MCNC: 125 MG/DL (ref 70–108)
POTASSIUM SERPL-SCNC: 5 MEQ/L (ref 3.5–5.2)
SODIUM BLD-SCNC: 137 MEQ/L (ref 135–145)
TOTAL PROTEIN: 6.9 G/DL (ref 6.1–8)

## 2021-09-29 ENCOUNTER — PATIENT MESSAGE (OUTPATIENT)
Dept: FAMILY MEDICINE CLINIC | Age: 41
End: 2021-09-29

## 2021-09-29 ENCOUNTER — HOSPITAL ENCOUNTER (OUTPATIENT)
Age: 41
Discharge: HOME | End: 2021-09-29
Payer: COMMERCIAL

## 2021-09-29 DIAGNOSIS — D72.18 EOSINOPHILIC GRANULOMATOSIS WITH POLYANGIITIS (EGPA) (HCC): ICD-10-CM

## 2021-09-29 DIAGNOSIS — M30.1 EOSINOPHILIC GRANULOMATOSIS WITH POLYANGIITIS (EGPA) (HCC): ICD-10-CM

## 2021-09-29 DIAGNOSIS — D72.18 EOSINOPHILIC GRANULOMATOSIS WITH POLYANGIITIS (EGPA) (HCC): Primary | ICD-10-CM

## 2021-09-29 DIAGNOSIS — M30.1 EOSINOPHILIC GRANULOMATOSIS WITH POLYANGIITIS (EGPA) (HCC): Primary | ICD-10-CM

## 2021-09-29 LAB
BASOPHILS # BLD: 0.5 %
BASOPHILS ABSOLUTE: 0.1 THOU/MM3 (ref 0–0.1)
EOSINOPHIL # BLD: 0.3 %
EOSINOPHILS ABSOLUTE: 0 THOU/MM3 (ref 0–0.4)
ERYTHROCYTE [DISTWIDTH] IN BLOOD BY AUTOMATED COUNT: 13.4 % (ref 11.5–14.5)
ERYTHROCYTE [DISTWIDTH] IN BLOOD BY AUTOMATED COUNT: 43.8 FL (ref 35–45)
HCT VFR BLD CALC: 47.4 % (ref 42–52)
HEMOGLOBIN: 15 GM/DL (ref 14–18)
IMMATURE GRANS (ABS): 0.06 THOU/MM3 (ref 0–0.07)
IMMATURE GRANULOCYTES: 0.6 %
LYMPHOCYTES # BLD: 7.4 %
LYMPHOCYTES ABSOLUTE: 0.8 THOU/MM3 (ref 1–4.8)
MCH RBC QN AUTO: 28.5 PG (ref 26–33)
MCHC RBC AUTO-ENTMCNC: 31.6 GM/DL (ref 32.2–35.5)
MCV RBC AUTO: 90.1 FL (ref 80–94)
MONOCYTES # BLD: 4.8 %
MONOCYTES ABSOLUTE: 0.5 THOU/MM3 (ref 0.4–1.3)
NUCLEATED RED BLOOD CELLS: 0 /100 WBC
PLATELET # BLD: 250 THOU/MM3 (ref 130–400)
PMV BLD AUTO: 11.6 FL (ref 9.4–12.4)
RBC # BLD: 5.26 MILL/MM3 (ref 4.7–6.1)
SEG NEUTROPHILS: 86.4 %
SEGMENTED NEUTROPHILS ABSOLUTE COUNT: 9.1 THOU/MM3 (ref 1.8–7.7)
WBC # BLD: 10.5 THOU/MM3 (ref 4.8–10.8)

## 2021-09-29 PROCEDURE — 85025 COMPLETE CBC W/AUTO DIFF WBC: CPT

## 2021-09-29 PROCEDURE — 84443 ASSAY THYROID STIM HORMONE: CPT

## 2021-09-29 PROCEDURE — 36415 COLL VENOUS BLD VENIPUNCTURE: CPT

## 2021-09-29 PROCEDURE — 80053 COMPREHEN METABOLIC PANEL: CPT

## 2021-09-29 NOTE — TELEPHONE ENCOUNTER
From: Romina Ferreira  To: JOHN Gómez CNP  Sent: 9/29/2021 12:56 PM EDT  Subject: Non-Urgent Medical Question    Kahlil Bear. One of my [facility] 's suggested I get my thyroid checked along with the bloodwork that they already have ordered. Are you able to order that along with what they already have me doing bi-weekly?

## 2021-09-30 LAB
ALBUMIN SERPL-MCNC: 4.9 G/DL (ref 3.5–5.1)
ALP BLD-CCNC: 82 U/L (ref 38–126)
ALT SERPL-CCNC: 30 U/L (ref 11–66)
ANION GAP SERPL CALCULATED.3IONS-SCNC: 12 MEQ/L (ref 8–16)
AST SERPL-CCNC: 28 U/L (ref 5–40)
BILIRUB SERPL-MCNC: 0.4 MG/DL (ref 0.3–1.2)
BUN BLDV-MCNC: 15 MG/DL (ref 7–22)
CALCIUM SERPL-MCNC: 9.7 MG/DL (ref 8.5–10.5)
CHLORIDE BLD-SCNC: 102 MEQ/L (ref 98–111)
CO2: 25 MEQ/L (ref 23–33)
CREAT SERPL-MCNC: 1.2 MG/DL (ref 0.4–1.2)
GFR SERPL CREATININE-BSD FRML MDRD: 66 ML/MIN/1.73M2
GLUCOSE BLD-MCNC: 113 MG/DL (ref 70–108)
POTASSIUM SERPL-SCNC: 4.5 MEQ/L (ref 3.5–5.2)
SODIUM BLD-SCNC: 139 MEQ/L (ref 135–145)
TOTAL PROTEIN: 7 G/DL (ref 6.1–8)
TSH SERPL DL<=0.05 MIU/L-ACNC: 0.98 UIU/ML (ref 0.4–4.2)

## 2021-10-04 RX ORDER — VALACYCLOVIR HYDROCHLORIDE 1 G/1
2000 TABLET, FILM COATED ORAL 2 TIMES DAILY
Qty: 4 TABLET | Refills: 5 | Status: SHIPPED | OUTPATIENT
Start: 2021-10-04 | End: 2021-10-05 | Stop reason: SDUPTHER

## 2021-10-05 ENCOUNTER — OFFICE VISIT (OUTPATIENT)
Dept: FAMILY MEDICINE CLINIC | Age: 41
End: 2021-10-05
Payer: COMMERCIAL

## 2021-10-05 VITALS
RESPIRATION RATE: 18 BRPM | DIASTOLIC BLOOD PRESSURE: 70 MMHG | BODY MASS INDEX: 36.74 KG/M2 | SYSTOLIC BLOOD PRESSURE: 130 MMHG | WEIGHT: 277.2 LBS | HEIGHT: 73 IN | OXYGEN SATURATION: 98 % | HEART RATE: 82 BPM | TEMPERATURE: 96.6 F

## 2021-10-05 DIAGNOSIS — G47.10 HYPERSOMNOLENCE: Primary | ICD-10-CM

## 2021-10-05 PROCEDURE — G8427 DOCREV CUR MEDS BY ELIG CLIN: HCPCS | Performed by: NURSE PRACTITIONER

## 2021-10-05 PROCEDURE — 99213 OFFICE O/P EST LOW 20 MIN: CPT | Performed by: NURSE PRACTITIONER

## 2021-10-05 PROCEDURE — G8417 CALC BMI ABV UP PARAM F/U: HCPCS | Performed by: NURSE PRACTITIONER

## 2021-10-05 PROCEDURE — G8484 FLU IMMUNIZE NO ADMIN: HCPCS | Performed by: NURSE PRACTITIONER

## 2021-10-05 PROCEDURE — 1036F TOBACCO NON-USER: CPT | Performed by: NURSE PRACTITIONER

## 2021-10-05 RX ORDER — PREDNISONE 1 MG/1
TABLET ORAL
COMMUNITY
Start: 2021-09-14

## 2021-10-05 RX ORDER — AZATHIOPRINE 50 MG/1
TABLET ORAL
COMMUNITY
Start: 2021-09-27

## 2021-10-05 RX ORDER — CELECOXIB 100 MG/1
CAPSULE ORAL
COMMUNITY
Start: 2021-07-13

## 2021-10-05 RX ORDER — BUSPIRONE HYDROCHLORIDE 30 MG/1
TABLET ORAL
COMMUNITY
Start: 2021-09-13 | End: 2022-09-07 | Stop reason: SDUPTHER

## 2021-10-05 RX ORDER — MEPOLIZUMAB 100 MG/ML
INJECTION, SOLUTION SUBCUTANEOUS
COMMUNITY
Start: 2021-09-27

## 2021-10-05 RX ORDER — PREDNISONE 1 MG/1
TABLET ORAL
COMMUNITY
Start: 2021-09-14 | End: 2022-03-09

## 2021-10-05 RX ORDER — ZILEUTON 600 MG/1
TABLET, MULTILAYER, EXTENDED RELEASE ORAL
COMMUNITY
Start: 2021-09-14

## 2021-10-05 RX ORDER — VALACYCLOVIR HYDROCHLORIDE 1 G/1
2000 TABLET, FILM COATED ORAL 2 TIMES DAILY
Qty: 20 TABLET | Refills: 5 | Status: SHIPPED | OUTPATIENT
Start: 2021-10-05 | End: 2021-10-06

## 2021-10-05 RX ORDER — DULOXETIN HYDROCHLORIDE 30 MG/1
CAPSULE, DELAYED RELEASE ORAL
COMMUNITY
Start: 2021-10-03 | End: 2022-09-07 | Stop reason: SDUPTHER

## 2021-10-05 RX ORDER — LAMOTRIGINE 200 MG/1
TABLET ORAL
COMMUNITY
Start: 2021-09-23

## 2021-10-05 SDOH — ECONOMIC STABILITY: FOOD INSECURITY: WITHIN THE PAST 12 MONTHS, YOU WORRIED THAT YOUR FOOD WOULD RUN OUT BEFORE YOU GOT MONEY TO BUY MORE.: NEVER TRUE

## 2021-10-05 SDOH — ECONOMIC STABILITY: FOOD INSECURITY: WITHIN THE PAST 12 MONTHS, THE FOOD YOU BOUGHT JUST DIDN'T LAST AND YOU DIDN'T HAVE MONEY TO GET MORE.: NEVER TRUE

## 2021-10-05 ASSESSMENT — ENCOUNTER SYMPTOMS
EYES NEGATIVE: 1
RESPIRATORY NEGATIVE: 1
GASTROINTESTINAL NEGATIVE: 1

## 2021-10-05 ASSESSMENT — PATIENT HEALTH QUESTIONNAIRE - PHQ9
SUM OF ALL RESPONSES TO PHQ9 QUESTIONS 1 & 2: 0
SUM OF ALL RESPONSES TO PHQ QUESTIONS 1-9: 0
2. FEELING DOWN, DEPRESSED OR HOPELESS: 0
1. LITTLE INTEREST OR PLEASURE IN DOING THINGS: 0

## 2021-10-05 ASSESSMENT — SOCIAL DETERMINANTS OF HEALTH (SDOH): HOW HARD IS IT FOR YOU TO PAY FOR THE VERY BASICS LIKE FOOD, HOUSING, MEDICAL CARE, AND HEATING?: NOT HARD AT ALL

## 2021-10-05 NOTE — PROGRESS NOTES
Gerardo Bhagat is a 42 y.o. male whopresents today for :  Chief Complaint   Patient presents with    Fatigue     constantly feeling tired, and lethargic and unable to sleep at night       HPI:     HPI   As above. Pt reports he is always tired but also has problems sleeping. Does have psych history and recently lamictal was increased. 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   He does have a cpap       Patient Active Problem List   Diagnosis    Respiratory failure ([address]    Reactive airway disease with wheezing        Past Medical History:   Diagnosis Date    Anxiety     Chronic rhinosinusitis     Depression     Hypercholesteremia     Vocal cord dysfunction       Past Surgical History:   Procedure Laterality Date    SINUS SURGERY  2016     Family History   Problem Relation Age of Onset    No Known Problems Mother     No Known Problems Father      Social History     Tobacco Use    Smoking status: Former Smoker     Packs/day: 1.00     Years: 14.00     Pack years: 14.00     Types: Cigarettes     Start date:      Quit date:      Years since quittin.7    Smokeless tobacco: Never Used   Substance Use Topics    Alcohol use: Yes      Current Outpatient Medications   Medication Sig Dispense Refill    valACYclovir (VALTREX) 1 g tablet Take 2 tablets by mouth 2 times daily for 1 day Prn for outbreak 20 tablet 5    omeprazole (PRILOSEC) 40 MG delayed release capsule Take 1 capsule by mouth daily 90 capsule 3    albuterol (PROVENTIL) (2.5 MG/3ML) 0.083% nebulizer solution inhale contents of 1 vial ( 3 milliliters ) in nebulizer by mouth and INTO THE LUNGS every 6 hours if needed for wheezing 375 mL 3    budesonide-formoterol (SYMBICORT) 160-4.5 MCG/ACT AERO Inhale 2 puffs into the lungs 2 times daily 1 Inhaler 3    cetirizine (ZYRTEC ALLERGY) 10 MG tablet Take 10 mg by mouth daily      DULoxetine HCl (CYMBALTA PO) Take 1 tablet by mouth daily      Melatonin 10 MG CAPS Take 2 capsules by mouth nightly      Albuterol Sulfate (PROAIR HFA IN) Inhale 1 puff into the lungs as needed      azaTHIOprine (IMURAN) 50 MG tablet take 3 tablets by mouth once daily      busPIRone (BUSPAR) 30 MG tablet take 1 tablet by mouth twice a day      celecoxib (CELEBREX) 100 MG capsule take 1 capsule by mouth twice a day if needed for pain      DULoxetine (CYMBALTA) 30 MG extended release capsule       lamoTRIgine (LAMICTAL) 200 MG tablet take 1 tablet by mouth once daily      NUCALA 100 MG/ML SOAJ injection       predniSONE (DELTASONE) 5 MG tablet take 1 tablet by mouth once daily      predniSONE (DELTASONE) 1 MG tablet take 4 tablets by mouth daily      zileuton (ZYFLO CR) 600 MG extended release tablet take 2 tablets by mouth twice a day      atorvastatin (LIPITOR) 10 MG tablet take 1 tablet by mouth once daily (Patient not taking: Reported on 10/5/2021) 30 tablet 5    fluticasone (FLONASE) 50 MCG/ACT nasal spray 2 sprays by Nasal route daily (Patient not taking: Reported on 10/5/2021) 1 Bottle 1    ipratropium (ATROVENT) 0.03 % nasal spray 2 sprays by Each Nostril route every 12 hours (Patient not taking: Reported on 10/5/2021) 1 Bottle 3    QUEtiapine (SEROQUEL) 50 MG tablet Take 50 mg by mouth nightly (Patient not taking: Reported on 10/5/2021)       No current facility-administered medications for this visit.      Allergies   Allergen Reactions    Cat Hair Extract     Ibuprofen      Shortness of breath, asthma like symptoms    Seasonal      Health Maintenance   Topic Date Due    Hepatitis C screen  Never done    Lipid screen  Never done    COVID-19 Vaccine (1) Never done    HIV screen  Never done    Diabetes screen  Never done    Flu vaccine (1) 09/01/2021    DTaP/Tdap/Td vaccine (2 - Td or Tdap) 12/17/2023    Hepatitis A vaccine  Aged Out    Hepatitis B vaccine  Aged Out    Hib vaccine  Aged Out    Meningococcal (ACWY) vaccine  Aged Out    Pneumococcal 0-64 years Vaccine  Aged [place] Subjective:     Review of Systems   Constitutional: Positive for fatigue. HENT: Negative. Eyes: Negative. Respiratory: Negative. Cardiovascular: Negative. Gastrointestinal: Negative. Musculoskeletal: Negative. Skin: Negative. Neurological: Negative. Objective:     Vitals:    10/05/21 0922   BP: 130/70   Site: Left Upper Arm   Position: Sitting   Cuff Size: Large Adult   Pulse: 82   Resp: 18   Temp: 96.6 °F (35.9 °C)   TempSrc: Temporal   SpO2: 98%   Weight: 277 lb 3.2 oz (125.7 kg)   Height: 6' 0.99\" (1.854 m)       Physical Exam  Constitutional:       Appearance: He is well-developed. HENT:      Head: Normocephalic. Right Ear: Tympanic membrane and external ear normal.      Left Ear: Tympanic membrane and external ear normal.      Nose: Nose normal.   Cardiovascular:      Rate and Rhythm: Normal rate and regular rhythm. Heart sounds: Normal heart sounds. No murmur heard. No friction rub. No gallop. Pulmonary:      Effort: Pulmonary effort is normal.      Breath sounds: Normal breath sounds. No wheezing or rales. Abdominal:      General: Bowel sounds are normal.      Palpations: Abdomen is soft. Tenderness: There is no abdominal tenderness. There is no guarding. Musculoskeletal:         General: Normal range of motion. Cervical back: Normal range of motion and neck supple. Lymphadenopathy:      Cervical: No cervical adenopathy. Skin:     General: Skin is warm. Neurological:      Mental Status: He is alert and oriented to person, place, and time. Deep Tendon Reflexes: Reflexes are normal and symmetric. Assessment:      Diagnosis Orders   1. Hypersomnolence         Plan:      No follow-ups on file. No orders of the defined types were placed in this encounter.     Orders Placed This Encounter   Medications    valACYclovir (VALTREX) 1 g tablet     Sig: Take 2 tablets by mouth 2 times daily for 1 day Prn for outbreak     Dispense:

## 2021-12-27 RX ORDER — OMEPRAZOLE 40 MG/1
CAPSULE, DELAYED RELEASE ORAL
Qty: 90 CAPSULE | Refills: 3 | Status: SHIPPED | OUTPATIENT
Start: 2021-12-27 | End: 2022-09-15 | Stop reason: SDUPTHER

## 2022-01-11 ENCOUNTER — PATIENT MESSAGE (OUTPATIENT)
Dept: FAMILY MEDICINE CLINIC | Age: 42
End: 2022-01-11

## 2022-01-11 DIAGNOSIS — M77.11 LATERAL EPICONDYLITIS OF RIGHT ELBOW: ICD-10-CM

## 2022-01-11 RX ORDER — PREDNISONE 1 MG/1
TABLET ORAL
Qty: 55 TABLET | Refills: 0 | Status: SHIPPED | OUTPATIENT
Start: 2022-01-11 | End: 2022-01-21

## 2022-01-11 NOTE — TELEPHONE ENCOUNTER
From: Brittanie Scott  To: Chema Osorio  Sent: 1/11/2022 4:55 PM EST  Subject: Prednisone    Diego Bear, I've been dealing with some mild breathing issues this week affecting my work at times. Would you be able to refill my prednisone? It's worked in the past when these things are recurring. They've been happening at the end of the night. Typically a nebulizer treatment keeps it at [facility]. Thanks.

## 2022-03-09 ENCOUNTER — OFFICE VISIT (OUTPATIENT)
Dept: FAMILY MEDICINE CLINIC | Age: 42
End: 2022-03-09
Payer: COMMERCIAL

## 2022-03-09 VITALS
SYSTOLIC BLOOD PRESSURE: 144 MMHG | RESPIRATION RATE: 20 BRPM | DIASTOLIC BLOOD PRESSURE: 84 MMHG | TEMPERATURE: 97.6 F | WEIGHT: 300 LBS | HEIGHT: 73 IN | BODY MASS INDEX: 39.76 KG/M2 | OXYGEN SATURATION: 95 % | HEART RATE: 76 BPM

## 2022-03-09 DIAGNOSIS — H66.002 NON-RECURRENT ACUTE SUPPURATIVE OTITIS MEDIA OF LEFT EAR WITHOUT SPONTANEOUS RUPTURE OF TYMPANIC MEMBRANE: Primary | ICD-10-CM

## 2022-03-09 PROCEDURE — G8417 CALC BMI ABV UP PARAM F/U: HCPCS | Performed by: NURSE PRACTITIONER

## 2022-03-09 PROCEDURE — G8427 DOCREV CUR MEDS BY ELIG CLIN: HCPCS | Performed by: NURSE PRACTITIONER

## 2022-03-09 PROCEDURE — 99213 OFFICE O/P EST LOW 20 MIN: CPT | Performed by: NURSE PRACTITIONER

## 2022-03-09 PROCEDURE — 1036F TOBACCO NON-USER: CPT | Performed by: NURSE PRACTITIONER

## 2022-03-09 PROCEDURE — G8484 FLU IMMUNIZE NO ADMIN: HCPCS | Performed by: NURSE PRACTITIONER

## 2022-03-09 RX ORDER — OFLOXACIN 3 MG/ML
SOLUTION/ DROPS OPHTHALMIC
Qty: 1 EACH | Refills: 0 | Status: SHIPPED | OUTPATIENT
Start: 2022-03-09

## 2022-03-09 RX ORDER — AMOXICILLIN AND CLAVULANATE POTASSIUM 875; 125 MG/1; MG/1
1 TABLET, FILM COATED ORAL 2 TIMES DAILY
Qty: 20 TABLET | Refills: 0 | Status: SHIPPED | OUTPATIENT
Start: 2022-03-09 | End: 2022-03-19

## 2022-03-09 RX ORDER — ALPRAZOLAM 0.25 MG/1
TABLET ORAL
COMMUNITY
Start: 2022-02-15

## 2022-03-09 ASSESSMENT — ENCOUNTER SYMPTOMS
RESPIRATORY NEGATIVE: 1
EYES NEGATIVE: 1
GASTROINTESTINAL NEGATIVE: 1

## 2022-03-09 NOTE — PROGRESS NOTES
Claudio Hand is a 36 y.o. male whopresents today for :  Chief Complaint   Patient presents with    Ear Fullness       HPI:     HPI     pt has a lot of ear pain and fullness.   Tried irrigating but did not help  Patient Active Problem List   Diagnosis    Respiratory failure ([address]    Reactive airway disease with wheezing        Past Medical History:   Diagnosis Date    Anxiety     Chronic rhinosinusitis     Depression     Hypercholesteremia     Vocal cord dysfunction       Past Surgical History:   Procedure Laterality Date    SINUS SURGERY  2016     Family History   Problem Relation Age of Onset    No Known Problems Mother     No Known Problems Father      Social History     Tobacco Use    Smoking status: Former Smoker     Packs/day: 1.00     Years: 14.00     Pack years: 14.00     Types: Cigarettes     Start date:      Quit date:      Years since quittin.1    Smokeless tobacco: Never Used   Substance Use Topics    Alcohol use: Yes      Current Outpatient Medications   Medication Sig Dispense Refill    ALPRAZolam (XANAX) 0.25 MG tablet take 1 tablet by mouth once daily if needed for PANIC/SEVERE ANXIETY      amoxicillin-clavulanate (AUGMENTIN) 875-125 MG per tablet Take 1 tablet by mouth 2 times daily for 10 days 20 tablet 0    ofloxacin (OCUFLOX) 0.3 % solution 4gtts bid to left ear times 7 days 1 each 0    omeprazole (PRILOSEC) 40 MG delayed release capsule take 1 capsule by mouth once daily 90 capsule 3    azaTHIOprine (IMURAN) 50 MG tablet take 3 tablets by mouth once daily      busPIRone (BUSPAR) 30 MG tablet take 1 tablet by mouth twice a day      celecoxib (CELEBREX) 100 MG capsule take 1 capsule by mouth twice a day if needed for pain      DULoxetine (CYMBALTA) 30 MG extended release capsule       lamoTRIgine (LAMICTAL) 200 MG tablet take 1 tablet by mouth once daily      NUCALA 100 MG/ML SOAJ injection       predniSONE (DELTASONE) 5 MG tablet take 1 tablet by mouth once daily      zileuton (ZYFLO CR) 600 MG extended release tablet take 2 tablets by mouth twice a day      albuterol (PROVENTIL) (2.5 MG/3ML) 0.083% nebulizer solution inhale contents of 1 vial ( 3 milliliters ) in nebulizer by mouth and INTO THE LUNGS every 6 hours if needed for wheezing 375 mL 3    budesonide-formoterol (SYMBICORT) 160-4.5 MCG/ACT AERO Inhale 2 puffs into the lungs 2 times daily 1 Inhaler 3    cetirizine (ZYRTEC ALLERGY) 10 MG tablet Take 10 mg by mouth daily      DULoxetine HCl (CYMBALTA PO) Take 1 tablet by mouth daily      Melatonin 10 MG CAPS Take 2 capsules by mouth nightly      Albuterol Sulfate (PROAIR HFA IN) Inhale 1 puff into the lungs as needed       No current facility-administered medications for this visit. Allergies   Allergen Reactions    Cat Hair Extract     Ibuprofen      Shortness of breath, asthma like symptoms    Seasonal      Health Maintenance   Topic Date Due    Hepatitis C screen  Never done    Lipid screen  Never done    HIV screen  Never done    Diabetes screen  Never done    COVID-19 Vaccine (1) 03/04/2023 (Originally 1/18/1992)    Flu vaccine (1) 03/09/2023 (Originally 9/1/2021)    Depression Screen  10/05/2022    DTaP/Tdap/Td vaccine (2 - Td or Tdap) 12/17/2023    Pneumococcal 0-64 years Vaccine (3 of 4 - PPSV23) 09/27/2024    Hepatitis A vaccine  Aged Out    Hepatitis B vaccine  Aged Out    Hib vaccine  Aged Out    Meningococcal (ACWY) vaccine  Aged Out       Subjective:     Review of Systems   Constitutional: Negative. HENT: Positive for ear pain. Eyes: Negative. Respiratory: Negative. Cardiovascular: Negative. Gastrointestinal: Negative. Musculoskeletal: Negative. Skin: Negative. Neurological: Negative.         Objective:     Vitals:    03/09/22 1445   BP: (!) 144/84   Site: Left Upper Arm   Position: Sitting   Cuff Size: Large Adult   Pulse: 76   Resp: 20   Temp: 97.6 °F (36.4 °C)   TempSrc: Temporal   SpO2: 95% Weight: 300 lb (136.1 kg)   Height: 6' 0.99\" (1.854 m)       Physical Exam  Constitutional:       Appearance: He is well-developed. HENT:      Head: Normocephalic. Right Ear: Tympanic membrane and external ear normal.      Left Ear: External ear normal. Tympanic membrane is erythematous and bulging. Ears:        Nose: Nose normal.   Cardiovascular:      Rate and Rhythm: Normal rate and regular rhythm. Heart sounds: Normal heart sounds. No murmur heard. No friction rub. No gallop. Pulmonary:      Effort: Pulmonary effort is normal.      Breath sounds: Normal breath sounds. No wheezing or rales. Abdominal:      General: Bowel sounds are normal.      Palpations: Abdomen is soft. Tenderness: There is no abdominal tenderness. There is no guarding. Musculoskeletal:         General: Normal range of motion. Cervical back: Normal range of motion and neck supple. Lymphadenopathy:      Cervical: No cervical adenopathy. Skin:     General: Skin is warm. Neurological:      Mental Status: He is alert and oriented to person, place, and time. Deep Tendon Reflexes: Reflexes are normal and symmetric. Assessment:      Diagnosis Orders   1. Non-recurrent acute suppurative otitis media of left ear without spontaneous rupture of tympanic membrane         Plan:      No follow-ups on file. No orders of the defined types were placed in this encounter. Orders Placed This Encounter   Medications    amoxicillin-clavulanate (AUGMENTIN) 875-125 MG per tablet     Sig: Take 1 tablet by mouth 2 times daily for 10 days     Dispense:  20 tablet     Refill:  0    ofloxacin (OCUFLOX) 0.3 % solution     Sigtts bid to left ear times 7 days     Dispense:  1 each     Refill:  0      See orders  Advised to call back directly if there are further questions, or if these symptoms fail to improve as anticipated or worsen. Patient given educational materials - seepatient instructions. Discussed use, benefit, and side effects of prescribed medications. All patient questions answered. Pt voiced understanding. Patient agreed withtreatment plan. Follow up as directed.      Electronically signed by JOHN Barragan CNP on 3/9/2022 at 4:34 PM

## 2022-09-06 ENCOUNTER — PATIENT MESSAGE (OUTPATIENT)
Dept: FAMILY MEDICINE CLINIC | Age: 42
End: 2022-09-06

## 2022-09-06 NOTE — TELEPHONE ENCOUNTER
From: Shyann Felton  To: Chema Osorio  Sent: 9/6/2022 11:46 AM EDT  Subject: Buspar & Cymbalta    Hi [facility], out of [place], is who I've doing therapy/counseling with since starting in mid-2020. Throughout that time all visits have been virtual. My counselor, as well as the person who handles my treatment/meds, and myself have all agreed that we can cease the regular sessions & continue with my current medications. This also aligns with [facility] as a group moving toward have only in person visits, doing away with virtual.     They've asked me to reach out to you if you're comfortable continuing to renew my prescriptions for Buspar & Cymbalta. I would just prefer to not start up with a new counseling group unless something drastic would happen in the future. I can gladly come in & talk with you more about this if you'd like. Thanks for everything as always. Tali

## 2022-09-07 RX ORDER — BUSPIRONE HYDROCHLORIDE 30 MG/1
TABLET ORAL
Qty: 90 TABLET | Refills: 1 | Status: SHIPPED | OUTPATIENT
Start: 2022-09-07 | End: 2022-09-15 | Stop reason: SDUPTHER

## 2022-09-07 RX ORDER — DULOXETIN HYDROCHLORIDE 30 MG/1
30 CAPSULE, DELAYED RELEASE ORAL 3 TIMES DAILY
Qty: 270 CAPSULE | Refills: 1 | Status: SHIPPED | OUTPATIENT
Start: 2022-09-07 | End: 2022-09-15 | Stop reason: SDUPTHER

## 2022-09-15 RX ORDER — BUSPIRONE HYDROCHLORIDE 30 MG/1
TABLET ORAL
Qty: 90 TABLET | Refills: 1 | Status: SHIPPED | OUTPATIENT
Start: 2022-09-15

## 2022-09-15 RX ORDER — OMEPRAZOLE 40 MG/1
CAPSULE, DELAYED RELEASE ORAL
Qty: 90 CAPSULE | Refills: 3 | Status: SHIPPED | OUTPATIENT
Start: 2022-09-15

## 2022-09-15 RX ORDER — DULOXETIN HYDROCHLORIDE 30 MG/1
30 CAPSULE, DELAYED RELEASE ORAL 3 TIMES DAILY
Qty: 270 CAPSULE | Refills: 1 | Status: SHIPPED | OUTPATIENT
Start: 2022-09-15

## 2022-10-06 ENCOUNTER — HOSPITAL ENCOUNTER (OUTPATIENT)
Age: 42
Discharge: HOME | End: 2022-10-06

## 2022-10-06 ENCOUNTER — NURSE ONLY (OUTPATIENT)
Dept: LAB | Age: 42
End: 2022-10-06

## 2022-10-06 LAB
BACTERIA: NORMAL
BASOPHILS # BLD: 0.8 %
BASOPHILS ABSOLUTE: 0 THOU/MM3 (ref 0–0.1)
BILIRUBIN URINE: NEGATIVE
BLOOD, URINE: NEGATIVE
CASTS: NORMAL /LPF
CASTS: NORMAL /LPF
CHARACTER, URINE: CLEAR
COLOR: YELLOW
CRYSTALS: NORMAL
EOSINOPHIL # BLD: 0.8 %
EOSINOPHILS ABSOLUTE: 0 THOU/MM3 (ref 0–0.4)
EPITHELIAL CELLS, UA: NORMAL /HPF
ERYTHROCYTE [DISTWIDTH] IN BLOOD BY AUTOMATED COUNT: 13.5 % (ref 11.5–14.5)
ERYTHROCYTE [DISTWIDTH] IN BLOOD BY AUTOMATED COUNT: 40.7 FL (ref 35–45)
GLUCOSE, URINE: NEGATIVE MG/DL
HCT VFR BLD CALC: 43.7 % (ref 42–52)
HEMOGLOBIN: 14.5 GM/DL (ref 14–18)
IMMATURE GRANS (ABS): 0.02 THOU/MM3 (ref 0–0.07)
IMMATURE GRANULOCYTES: 0.4 %
KETONES, URINE: NEGATIVE
LEUKOCYTE ESTERASE, URINE: NEGATIVE
LYMPHOCYTES # BLD: 21.4 %
LYMPHOCYTES ABSOLUTE: 1.1 THOU/MM3 (ref 1–4.8)
MCH RBC QN AUTO: 27.7 PG (ref 26–33)
MCHC RBC AUTO-ENTMCNC: 33.2 GM/DL (ref 32.2–35.5)
MCV RBC AUTO: 83.4 FL (ref 80–94)
MISCELLANEOUS LAB TEST RESULT: NORMAL
MONOCYTES # BLD: 8.1 %
MONOCYTES ABSOLUTE: 0.4 THOU/MM3 (ref 0.4–1.3)
NITRITE, URINE: NEGATIVE
NUCLEATED RED BLOOD CELLS: 0 /100 WBC
PH UA: 6 (ref 5–9)
PLATELET # BLD: 222 THOU/MM3 (ref 130–400)
PMV BLD AUTO: 12.2 FL (ref 9.4–12.4)
PROTEIN UA: NEGATIVE MG/DL
RBC # BLD: 5.24 MILL/MM3 (ref 4.7–6.1)
RBC URINE: NORMAL /HPF
RENAL EPITHELIAL, UA: NORMAL
SEG NEUTROPHILS: 68.5 %
SEGMENTED NEUTROPHILS ABSOLUTE COUNT: 3.4 THOU/MM3 (ref 1.8–7.7)
SPECIFIC GRAVITY UA: 1.02 (ref 1–1.03)
UROBILINOGEN, URINE: 0.2 EU/DL (ref 0–1)
WBC # BLD: 5 THOU/MM3 (ref 4.8–10.8)
WBC UA: NORMAL /HPF
YEAST: NORMAL

## 2022-10-07 LAB
ALBUMIN SERPL-MCNC: 4.2 G/DL (ref 3.5–5.1)
ALP BLD-CCNC: 77 U/L (ref 38–126)
ALT SERPL-CCNC: 22 U/L (ref 11–66)
ANION GAP SERPL CALCULATED.3IONS-SCNC: 13 MEQ/L (ref 8–16)
AST SERPL-CCNC: 30 U/L (ref 5–40)
BILIRUB SERPL-MCNC: 0.3 MG/DL (ref 0.3–1.2)
BUN BLDV-MCNC: 18 MG/DL (ref 7–22)
C-REACTIVE PROTEIN: < 0.3 MG/DL (ref 0–1)
CALCIUM SERPL-MCNC: 9.6 MG/DL (ref 8.5–10.5)
CHLORIDE BLD-SCNC: 102 MEQ/L (ref 98–111)
CO2: 26 MEQ/L (ref 23–33)
CREAT SERPL-MCNC: 1.3 MG/DL (ref 0.4–1.2)
GFR SERPL CREATININE-BSD FRML MDRD: 60 ML/MIN/1.73M2
GLUCOSE BLD-MCNC: 95 MG/DL (ref 70–108)
POTASSIUM SERPL-SCNC: 4.3 MEQ/L (ref 3.5–5.2)
SEDIMENTATION RATE, ERYTHROCYTE: 5 MM/HR (ref 0–10)
SODIUM BLD-SCNC: 141 MEQ/L (ref 135–145)
TOTAL PROTEIN: 7 G/DL (ref 6.1–8)
VITAMIN D 25-HYDROXY: 32 NG/ML (ref 30–100)

## 2022-10-18 ENCOUNTER — HOSPITAL ENCOUNTER (OUTPATIENT)
Dept: NON INVASIVE DIAGNOSTICS | Age: 42
Discharge: HOME | End: 2022-10-18
Payer: COMMERCIAL

## 2022-10-18 DIAGNOSIS — Z79.899 HIGH RISK MEDICATION USE: ICD-10-CM

## 2022-10-18 DIAGNOSIS — D72.18 EOSINOPHILIC GRANULOMATOSIS WITH POLYANGIITIS (EGPA) (HCC): ICD-10-CM

## 2022-10-18 DIAGNOSIS — M30.1 EOSINOPHILIC GRANULOMATOSIS WITH POLYANGIITIS (EGPA) (HCC): ICD-10-CM

## 2022-10-18 PROCEDURE — 93306 TTE W/DOPPLER COMPLETE: CPT

## 2022-11-29 ENCOUNTER — OFFICE VISIT (OUTPATIENT)
Dept: FAMILY MEDICINE CLINIC | Age: 42
End: 2022-11-29
Payer: COMMERCIAL

## 2022-11-29 VITALS
DIASTOLIC BLOOD PRESSURE: 70 MMHG | OXYGEN SATURATION: 94 % | BODY MASS INDEX: 40.69 KG/M2 | WEIGHT: 307 LBS | SYSTOLIC BLOOD PRESSURE: 124 MMHG | HEART RATE: 66 BPM | HEIGHT: 73 IN | RESPIRATION RATE: 16 BRPM | TEMPERATURE: 97.2 F

## 2022-11-29 DIAGNOSIS — R53.83 OTHER FATIGUE: ICD-10-CM

## 2022-11-29 DIAGNOSIS — Z00.00 ROUTINE PHYSICAL EXAMINATION: Primary | ICD-10-CM

## 2022-11-29 DIAGNOSIS — K43.9 VENTRAL HERNIA WITHOUT OBSTRUCTION OR GANGRENE: ICD-10-CM

## 2022-11-29 PROCEDURE — 99396 PREV VISIT EST AGE 40-64: CPT | Performed by: NURSE PRACTITIONER

## 2022-11-29 SDOH — ECONOMIC STABILITY: FOOD INSECURITY: WITHIN THE PAST 12 MONTHS, THE FOOD YOU BOUGHT JUST DIDN'T LAST AND YOU DIDN'T HAVE MONEY TO GET MORE.: NEVER TRUE

## 2022-11-29 SDOH — ECONOMIC STABILITY: FOOD INSECURITY: WITHIN THE PAST 12 MONTHS, YOU WORRIED THAT YOUR FOOD WOULD RUN OUT BEFORE YOU GOT MONEY TO BUY MORE.: NEVER TRUE

## 2022-11-29 ASSESSMENT — ENCOUNTER SYMPTOMS
RESPIRATORY NEGATIVE: 1
ABDOMINAL PAIN: 1
EYES NEGATIVE: 1

## 2022-11-29 ASSESSMENT — PATIENT HEALTH QUESTIONNAIRE - PHQ9
SUM OF ALL RESPONSES TO PHQ QUESTIONS 1-9: 0
1. LITTLE INTEREST OR PLEASURE IN DOING THINGS: 0
SUM OF ALL RESPONSES TO PHQ QUESTIONS 1-9: 0
2. FEELING DOWN, DEPRESSED OR HOPELESS: 0
SUM OF ALL RESPONSES TO PHQ9 QUESTIONS 1 & 2: 0
SUM OF ALL RESPONSES TO PHQ QUESTIONS 1-9: 0
SUM OF ALL RESPONSES TO PHQ QUESTIONS 1-9: 0

## 2022-11-29 ASSESSMENT — SOCIAL DETERMINANTS OF HEALTH (SDOH): HOW HARD IS IT FOR YOU TO PAY FOR THE VERY BASICS LIKE FOOD, HOUSING, MEDICAL CARE, AND HEATING?: NOT HARD AT ALL

## 2022-11-29 NOTE — PROGRESS NOTES
Romana Roof is a 36 y.o. male whopresents today for :  Chief Complaint   Patient presents with    Other     Bump in stomach area       HPI:     HPI  Pt here for yearly check.   Reports that he has lump on abd that is slowly getting larger       Patient Active Problem List   Diagnosis    Respiratory failure ([facility] [address]    Reactive airway disease with wheezing        Past Medical History:   Diagnosis Date    Anxiety     Chronic rhinosinusitis     Depression     Hypercholesteremia     Vocal cord dysfunction       Past Surgical History:   Procedure Laterality Date    SINUS SURGERY  2016     Family History   Problem Relation Age of Onset    No Known Problems Mother     No Known Problems Father      Social History     Tobacco Use    Smoking status: Former     Packs/day: 1.00     Years: 14.00     Pack years: 14.00     Types: Cigarettes     Start date:      Quit date:      Years since quittin.9    Smokeless tobacco: Never   Substance Use Topics    Alcohol use: Yes      Current Outpatient Medications   Medication Sig Dispense Refill    busPIRone (BUSPAR) 30 MG tablet take 1/2 tablet by mouth twice a day 90 tablet 1    omeprazole (PRILOSEC) 40 MG delayed release capsule take 1 capsule by mouth once daily 90 capsule 3    DULoxetine (CYMBALTA) 30 MG extended release capsule Take 1 capsule by mouth 3 times daily 270 capsule 1    ALPRAZolam (XANAX) 0.25 MG tablet take 1 tablet by mouth once daily if needed for PANIC/SEVERE ANXIETY      ofloxacin (OCUFLOX) 0.3 % solution 4gtts bid to left ear times 7 days 1 each 0    azaTHIOprine (IMURAN) 50 MG tablet take 3 tablets by mouth once daily      celecoxib (CELEBREX) 100 MG capsule take 1 capsule by mouth twice a day if needed for pain      NUCALA 100 MG/ML SOAJ injection       predniSONE (DELTASONE) 5 MG tablet take 1 tablet by mouth once daily      albuterol (PROVENTIL) (2.5 MG/3ML) 0.083% nebulizer solution inhale contents of 1 vial ( 3 milliliters ) in nebulizer by mouth and INTO THE LUNGS every 6 hours if needed for wheezing 375 mL 3    budesonide-formoterol (SYMBICORT) 160-4.5 MCG/ACT AERO Inhale 2 puffs into the lungs 2 times daily 1 Inhaler 3    cetirizine (ZYRTEC) 10 MG tablet Take 10 mg by mouth daily      Melatonin 10 MG CAPS Take 2 capsules by mouth nightly      Albuterol Sulfate (PROAIR HFA IN) Inhale 1 puff into the lungs as needed      lamoTRIgine (LAMICTAL) 200 MG tablet take 1 tablet by mouth once daily      zileuton (ZYFLO CR) 600 MG extended release tablet take 2 tablets by mouth twice a day      DULoxetine HCl (CYMBALTA PO) Take 1 tablet by mouth daily       No current facility-administered medications for this visit. Allergies   Allergen Reactions    Cat Hair Extract     Ibuprofen      Shortness of breath, asthma like symptoms    Seasonal      Health Maintenance   Topic Date Due    HIV screen  Never done    Lipids  Never done    Flu vaccine (1) 08/01/2022    Depression Screen  10/05/2022    COVID-19 Vaccine (1) 03/04/2023 (Originally 1980)    DTaP/Tdap/Td vaccine (2 - Td or Tdap) 12/17/2023    Pneumococcal 0-64 years Vaccine (3 - PPSV23 if available, else PCV20) 09/27/2024    Hepatitis C screen  Completed    Hepatitis A vaccine  Aged Out    Hib vaccine  Aged Out    Meningococcal (ACWY) vaccine  Aged Out       Subjective:     Review of Systems   Constitutional: Negative. HENT: Negative. Eyes: Negative. Respiratory: Negative. Cardiovascular: Negative. Gastrointestinal:  Positive for abdominal pain. Musculoskeletal: Negative. Skin: Negative. Neurological: Negative. Objective:     Vitals:    11/29/22 1424   BP: 124/70   Site: Left Upper Arm   Position: Sitting   Cuff Size: Large Adult   Pulse: 66   Resp: 16   Temp: 97.2 °F (36.2 °C)   TempSrc: Temporal   SpO2: 94%   Weight: (!) 307 lb (139.3 kg)   Height: 6' 0.99\" (1.854 m)       Physical Exam  Constitutional:       Appearance: He is well-developed.    HENT:      Head: Normocephalic. Right Ear: Tympanic membrane and external ear normal.      Left Ear: Tympanic membrane and external ear normal.      Nose: Nose normal.   Cardiovascular:      Rate and Rhythm: Normal rate and regular rhythm. Heart sounds: Normal heart sounds. No murmur heard. No friction rub. No gallop. Pulmonary:      Effort: Pulmonary effort is normal.      Breath sounds: Normal breath sounds. No wheezing or rales. Abdominal:      General: Bowel sounds are normal.      Palpations: Abdomen is soft. Tenderness: There is no abdominal tenderness. There is no guarding. Hernia: A hernia is present. Hernia is present in the ventral area. Musculoskeletal:         General: Normal range of motion. Cervical back: Normal range of motion and neck supple. Lymphadenopathy:      Cervical: No cervical adenopathy. Skin:     General: Skin is warm. Neurological:      Mental Status: He is alert and oriented to person, place, and time. Deep Tendon Reflexes: Reflexes are normal and symmetric. Assessment:      Diagnosis Orders   1. Routine physical examination  Lipid Panel      2. Ventral hernia without obstruction or gangrene  [address], [place], General Surgery, [facility]      3. Other fatigue  Testosterone          Plan:      No follow-ups on file.        Orders Placed This Encounter   Procedures    Lipid Panel     Standing Status:   Future     Standing Expiration Date:   11/29/2023     Order Specific Question:   Is Patient Fasting?/# of Hours     Answer:   12    Testosterone     Standing Status:   Future     Standing Expiration Date:   11/29/2023    [address], MD, General Surgery, [email or web address]     Referral Priority:   Routine     Referral Type:   Eval and Treat     Referral Reason:   Specialty Services Required     Referred to Provider:   Lauren Martel MD     Requested Specialty:   General Surgery     Number of Visits Requested:   1     No orders of the defined types were placed in this encounter. See orders  Will get updated lipid and testosterone level    Patient given educational materials - seepatient instructions. Discussed use, benefit, and side effects of prescribed medications. All patient questions answered. Pt voiced understanding. Patient agreed withtreatment plan. Follow up as directed.      Electronically signed by JOHN Palacios CNP on 11/29/2022 at 7:04 PM

## 2022-12-12 ENCOUNTER — TELEPHONE (OUTPATIENT)
Dept: SURGERY | Age: 42
End: 2022-12-12

## 2022-12-12 ENCOUNTER — OFFICE VISIT (OUTPATIENT)
Dept: SURGERY | Age: 42
End: 2022-12-12
Payer: COMMERCIAL

## 2022-12-12 VITALS
DIASTOLIC BLOOD PRESSURE: 80 MMHG | BODY MASS INDEX: 38.89 KG/M2 | HEART RATE: 87 BPM | TEMPERATURE: 97.6 F | OXYGEN SATURATION: 96 % | HEIGHT: 74 IN | WEIGHT: 303 LBS | SYSTOLIC BLOOD PRESSURE: 128 MMHG

## 2022-12-12 DIAGNOSIS — M30.1 PULMONARY DISEASE DUE TO EOSINOPHILIC GRANULOMATOSIS WITH POLYANGIITIS (EGPA) (HCC): ICD-10-CM

## 2022-12-12 DIAGNOSIS — R22.2 ABDOMINAL WALL MASS: Primary | ICD-10-CM

## 2022-12-12 PROCEDURE — 99243 OFF/OP CNSLTJ NEW/EST LOW 30: CPT | Performed by: SURGERY

## 2022-12-12 NOTE — PROGRESS NOTES
Sly Smith MD   General Surgery  New Patient Evaluation in Office  Pt Name: Masha Vickers  Date of Birth 1980   Today's Date: 12/12/2022  Medical Record Number: [id number]  Referring Provider: JOHN Sparks -*  Primary Care Provider: JOHN Bailey - ANASTASIA  Chief Complaint:  Chief Complaint   Patient presents with    Surgical Consult     New patient-referred by Kyaw Coffey CNP - Ventral hernia       ASSESSMENT      1. Abdominal wall mass    2. 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 Suspect soft tissue mass versus hernia. Could offer additional imaging versus just surgical exploration and excision/repair. Patient desires to proceed with primary surgical route. However, he may be out of network at [facility] He states he will discuss with his wife. If he wishes to proceed with surgery here with higher out-of-pocket cost we will be happy to proceed. Techniques of surgery discussed. Risks include but not limited to bleeding, infection, need for possible mesh repair of hernia identified as well as recurrence of hernia, scar and pain. Potential for poor wound healing on steroids discussed as well. Patient expressed understanding and will contact our office if he wishes to proceed with surgery here. Pulmonary records reviewed from [facility]. Kate Waterman is a 36y.o. year old male who is presenting today in the office for evaluation of a possible abdominal wall hernia. He is felt a mass in his abdominal wall its been there for some time but it is getting bigger and it is concerning to him. Its in the left upper quadrant about 8 cm above the umbilicus lateral to the midline. At least 6 cm in diameter and feels deeper either within the subcutaneous fat or possibly within the rectus sheath. It is not reducible. Salty Bowieezra denies prior abdominal surgeries.   He desires excision. Patient follows with pulmonary medicine at [facility] clinic. He has no pulmonary symptoms on current medical regimen. Past Medical History  Past Medical History:   Diagnosis Date    Anxiety     Chronic rhinosinusitis     Depression     Hypercholesteremia     Vocal cord dysfunction        Past Surgical History  Past Surgical History:   Procedure Laterality Date    SINUS SURGERY  12/01/2016    SINUS SURGERY  2021    [place]       Medications  Current Outpatient Medications   Medication Sig Dispense Refill    busPIRone (BUSPAR) 30 MG tablet take 1/2 tablet by mouth twice a day 90 tablet 1    omeprazole (PRILOSEC) 40 MG delayed release capsule take 1 capsule by mouth once daily 90 capsule 3    DULoxetine (CYMBALTA) 30 MG extended release capsule Take 1 capsule by mouth 3 times daily 270 capsule 1    azaTHIOprine (IMURAN) 50 MG tablet take 3 tablets by mouth once daily      celecoxib (CELEBREX) 100 MG capsule take 1 capsule by mouth twice a day if needed for pain      NUCALA 100 MG/ML SOAJ injection       predniSONE (DELTASONE) 5 MG tablet take 1 tablet by mouth once daily      albuterol (PROVENTIL) (2.5 MG/3ML) 0.083% nebulizer solution inhale contents of 1 vial ( 3 milliliters ) in nebulizer by mouth and INTO THE LUNGS every 6 hours if needed for wheezing 375 mL 3    budesonide-formoterol (SYMBICORT) 160-4.5 MCG/ACT AERO Inhale 2 puffs into the lungs 2 times daily 1 Inhaler 3    cetirizine (ZYRTEC) 10 MG tablet Take 10 mg by mouth daily      Albuterol Sulfate (PROAIR HFA IN) Inhale 1 puff into the lungs as needed      lamoTRIgine (LAMICTAL) 200 MG tablet take 1 tablet by mouth once daily       No current facility-administered medications for this visit.      Allergies     Allergies   Allergen Reactions    Cat Hair Extract     Ibuprofen      Shortness of breath, asthma like symptoms    Seasonal        Family History  Family History   Problem Relation Age of Onset    No Known Problems Mother No Known Problems Father     No Known Problems Sister     No Known Problems Sister        SocialHistory  Social History     Socioeconomic History    Marital status:      Spouse name: Not on file    Number of children: Not on file    Years of education: Not on file    Highest education level: Not on file   Occupational History    Not on file   Tobacco Use    Smoking status: Former     Packs/day: 1.00     Years: 14.00     Pack years: 14.00     Types: Cigarettes     Start date:      Quit date:      Years since quittin.9    Smokeless tobacco: Never   Vaping Use    Vaping Use: Former    Substances: Nicotine   Substance and Sexual Activity    Alcohol use: Yes    Drug use: Never    Sexual activity: Not on file   Other Topics Concern    Not on file   Social History Narrative    Not on file     Social Determinants of Health     Financial Resource Strain: Low Risk     Difficulty of Paying Living Expenses: Not hard at all   Food Insecurity: No Food Insecurity    Worried About Running Out of Food in the Last Year: Never true    Ran Out of Food in the Last Year: Never true   Transportation Needs: Not on file   Physical Activity: Not on file   Stress: Not on file   Social Connections: Not on file   Intimate Partner Violence: Not on file   Housing Stability: Not on file           Review of Systems  Review of Systems   Constitutional:  Negative for chills, fatigue, fever and unexpected weight change. HENT:  Negative for sore throat, trouble swallowing and voice change. Eyes:  Negative for visual disturbance. Respiratory:  Negative for cough, shortness of breath and wheezing. Cardiovascular:  Negative for chest pain and palpitations. Gastrointestinal:  Positive for abdominal distention. Negative for abdominal pain, blood in stool, nausea and vomiting. Endocrine: Negative for cold intolerance, heat intolerance and polydipsia. Genitourinary:  Negative for dysuria, flank pain and hematuria. Musculoskeletal:  Negative for gait problem, joint swelling and myalgias. Skin:  Negative for color change and rash. Allergic/Immunologic: Negative for immunocompromised state. Neurological:  Negative for dizziness, tremors, seizures and speech difficulty. Hematological:  Does not bruise/bleed easily. Psychiatric/Behavioral:  Negative for behavioral problems, confusion and suicidal ideas. OBJECTIVE     /80 (Site: Left Upper Arm, Position: Sitting, Cuff Size: Large Adult)   Pulse 87   Temp 97.6 °F (36.4 °C)   Ht 6' 2\" (1.88 m)   Wt (!) 303 lb (137.4 kg)   SpO2 96%   BMI 38.90 kg/m²      Physical Exam  Vitals reviewed. Constitutional:       Appearance: Normal appearance. He is well-developed. He is obese. He is not diaphoretic. HENT:      Head: Normocephalic and atraumatic. Nose: No congestion. Eyes:      General: No scleral icterus. Extraocular Movements: Extraocular movements intact. Pupils: Pupils are equal, round, and reactive to light. Neck:      Thyroid: No thyromegaly. Vascular: No JVD. Trachea: No tracheal deviation. Cardiovascular:      Rate and Rhythm: Normal rate and regular rhythm. Heart sounds: Normal heart sounds. No murmur heard. Pulmonary:      Effort: Pulmonary effort is normal. No respiratory distress. Breath sounds: Normal breath sounds. No wheezing. Abdominal:      General: There is no distension. Palpations: Abdomen is soft. There is mass. Tenderness: There is no abdominal tenderness. There is no guarding or rebound. Hernia: No hernia is present. Musculoskeletal:         General: No tenderness or deformity. Cervical back: Neck supple. No rigidity. Lymphadenopathy:      Cervical: No cervical adenopathy. Skin:     General: Skin is warm and dry. Coloration: Skin is not jaundiced or pale. Findings: No rash. Neurological:      General: No focal deficit present.       Mental Status: He is alert and oriented to person, place, and time. Cranial Nerves: No cranial nerve deficit.    Psychiatric:         Mood and Affect: Mood normal.         Behavior: Behavior normal.       Lab Results   Component Value Date    WBC 5.0 10/06/2022    HGB 14.5 10/06/2022    HCT 43.7 10/06/2022     10/06/2022    ALT 22 10/06/2022    AST 30 10/06/2022     10/06/2022    K 4.3 10/06/2022     10/06/2022    CREATININE 1.3 (H) 10/06/2022    BUN 18 10/06/2022    CO2 26 10/06/2022    TSH 0.983 09/29/2021    INR 0.97 09/10/2020     No pertinent imaging

## 2022-12-12 NOTE — TELEPHONE ENCOUNTER
[facility] Per Martin Wright no precert required for CPT code [id number] as long as in network. Asked to verify that Willow Cassidy was in network and she did confirm.   Call ref # [id number]

## 2022-12-13 PROBLEM — M30.1: Status: ACTIVE | Noted: 2022-12-13

## 2022-12-13 PROBLEM — R22.2 ABDOMINAL WALL MASS: Status: ACTIVE | Noted: 2022-12-13

## 2022-12-13 ASSESSMENT — ENCOUNTER SYMPTOMS
WHEEZING: 0
ABDOMINAL PAIN: 0
COUGH: 0
VOMITING: 0
COLOR CHANGE: 0
ABDOMINAL DISTENTION: 1
SHORTNESS OF BREATH: 0
VOICE CHANGE: 0
NAUSEA: 0
BLOOD IN STOOL: 0
TROUBLE SWALLOWING: 0
SORE THROAT: 0

## 2022-12-14 ENCOUNTER — NURSE ONLY (OUTPATIENT)
Dept: LAB | Age: 42
End: 2022-12-14

## 2022-12-14 DIAGNOSIS — R53.83 OTHER FATIGUE: ICD-10-CM

## 2022-12-14 DIAGNOSIS — Z00.00 ROUTINE PHYSICAL EXAMINATION: ICD-10-CM

## 2022-12-14 LAB
ALBUMIN SERPL-MCNC: 4.2 G/DL (ref 3.5–5.1)
ALP BLD-CCNC: 79 U/L (ref 38–126)
ALT SERPL-CCNC: 15 U/L (ref 11–66)
ANION GAP SERPL CALCULATED.3IONS-SCNC: 10 MEQ/L (ref 8–16)
AST SERPL-CCNC: 17 U/L (ref 5–40)
BACTERIA: ABNORMAL
BASOPHILS # BLD: 0.8 %
BASOPHILS ABSOLUTE: 0 THOU/MM3 (ref 0–0.1)
BILIRUB SERPL-MCNC: 0.2 MG/DL (ref 0.3–1.2)
BILIRUBIN URINE: NEGATIVE
BLOOD, URINE: NEGATIVE
BUN BLDV-MCNC: 15 MG/DL (ref 7–22)
C-REACTIVE PROTEIN: < 0.3 MG/DL (ref 0–1)
CALCIUM SERPL-MCNC: 9.6 MG/DL (ref 8.5–10.5)
CASTS: ABNORMAL /LPF
CASTS: ABNORMAL /LPF
CHARACTER, URINE: CLEAR
CHLORIDE BLD-SCNC: 104 MEQ/L (ref 98–111)
CHOLESTEROL, TOTAL: 222 MG/DL (ref 100–199)
CO2: 26 MEQ/L (ref 23–33)
COLOR: YELLOW
CREAT SERPL-MCNC: 1.3 MG/DL (ref 0.4–1.2)
CRYSTALS: ABNORMAL
EOSINOPHIL # BLD: 1.5 %
EOSINOPHILS ABSOLUTE: 0.1 THOU/MM3 (ref 0–0.4)
EPITHELIAL CELLS, UA: ABNORMAL /HPF
ERYTHROCYTE [DISTWIDTH] IN BLOOD BY AUTOMATED COUNT: 14.3 % (ref 11.5–14.5)
ERYTHROCYTE [DISTWIDTH] IN BLOOD BY AUTOMATED COUNT: 43.9 FL (ref 35–45)
GFR SERPL CREATININE-BSD FRML MDRD: > 60 ML/MIN/1.73M2
GLUCOSE BLD-MCNC: 91 MG/DL (ref 70–108)
GLUCOSE, URINE: NEGATIVE MG/DL
HCT VFR BLD CALC: 45.8 % (ref 42–52)
HDLC SERPL-MCNC: 42 MG/DL
HEMOGLOBIN: 15.1 GM/DL (ref 14–18)
IMMATURE GRANS (ABS): 0.03 THOU/MM3 (ref 0–0.07)
IMMATURE GRANULOCYTES: 0.6 %
KETONES, URINE: ABNORMAL
LDL CHOLESTEROL CALCULATED: 115 MG/DL
LEUKOCYTE ESTERASE, URINE: NEGATIVE
LYMPHOCYTES # BLD: 22.6 %
LYMPHOCYTES ABSOLUTE: 1.2 THOU/MM3 (ref 1–4.8)
MCH RBC QN AUTO: 28.3 PG (ref 26–33)
MCHC RBC AUTO-ENTMCNC: 33 GM/DL (ref 32.2–35.5)
MCV RBC AUTO: 85.8 FL (ref 80–94)
MISCELLANEOUS LAB TEST RESULT: ABNORMAL
MONOCYTES # BLD: 9.1 %
MONOCYTES ABSOLUTE: 0.5 THOU/MM3 (ref 0.4–1.3)
NITRITE, URINE: NEGATIVE
NUCLEATED RED BLOOD CELLS: 0 /100 WBC
PH UA: 5 (ref 5–9)
PLATELET # BLD: 239 THOU/MM3 (ref 130–400)
PMV BLD AUTO: 12 FL (ref 9.4–12.4)
POTASSIUM SERPL-SCNC: 4.4 MEQ/L (ref 3.5–5.2)
PROTEIN UA: NEGATIVE MG/DL
RBC # BLD: 5.34 MILL/MM3 (ref 4.7–6.1)
RBC URINE: ABNORMAL /HPF
RENAL EPITHELIAL, UA: ABNORMAL
SEDIMENTATION RATE, ERYTHROCYTE: 7 MM/HR (ref 0–10)
SEG NEUTROPHILS: 65.4 %
SEGMENTED NEUTROPHILS ABSOLUTE COUNT: 3.4 THOU/MM3 (ref 1.8–7.7)
SODIUM BLD-SCNC: 140 MEQ/L (ref 135–145)
SPECIFIC GRAVITY UA: 1.02 (ref 1–1.03)
TOTAL PROTEIN: 6.8 G/DL (ref 6.1–8)
TRIGL SERPL-MCNC: 323 MG/DL (ref 0–199)
UROBILINOGEN, URINE: 0.2 EU/DL (ref 0–1)
VITAMIN D 25-HYDROXY: 30 NG/ML (ref 30–100)
WBC # BLD: 5.2 THOU/MM3 (ref 4.8–10.8)
WBC UA: ABNORMAL /HPF
YEAST: ABNORMAL

## 2022-12-16 LAB — TESTOSTERONE TOTAL: 92 NG/DL (ref 300–890)

## 2022-12-20 ENCOUNTER — HOSPITAL ENCOUNTER (OUTPATIENT)
Age: 42
Setting detail: OUTPATIENT SURGERY
Discharge: HOME | End: 2022-12-20
Attending: SURGERY | Admitting: SURGERY
Payer: COMMERCIAL

## 2022-12-20 ENCOUNTER — ANESTHESIA (OUTPATIENT)
Dept: OPERATING ROOM | Age: 42
End: 2022-12-20
Payer: COMMERCIAL

## 2022-12-20 ENCOUNTER — ANESTHESIA EVENT (OUTPATIENT)
Dept: OPERATING ROOM | Age: 42
End: 2022-12-20
Payer: COMMERCIAL

## 2022-12-20 VITALS
HEART RATE: 69 BPM | OXYGEN SATURATION: 94 % | WEIGHT: 305.6 LBS | DIASTOLIC BLOOD PRESSURE: 69 MMHG | HEIGHT: 74 IN | RESPIRATION RATE: 18 BRPM | SYSTOLIC BLOOD PRESSURE: 145 MMHG | BODY MASS INDEX: 39.22 KG/M2 | TEMPERATURE: 96.6 F

## 2022-12-20 DIAGNOSIS — R22.2 ABDOMINAL WALL MASS: ICD-10-CM

## 2022-12-20 PROCEDURE — 3600000012 HC SURGERY LEVEL 2 ADDTL 15MIN: Performed by: SURGERY

## 2022-12-20 PROCEDURE — 2500000003 HC RX 250 WO HCPCS

## 2022-12-20 PROCEDURE — 3700000000 HC ANESTHESIA ATTENDED CARE: Performed by: SURGERY

## 2022-12-20 PROCEDURE — 6360000002 HC RX W HCPCS: Performed by: SURGERY

## 2022-12-20 PROCEDURE — 2500000003 HC RX 250 WO HCPCS: Performed by: SURGERY

## 2022-12-20 PROCEDURE — 7100000000 HC PACU RECOVERY - FIRST 15 MIN: Performed by: SURGERY

## 2022-12-20 PROCEDURE — 88304 TISSUE EXAM BY PATHOLOGIST: CPT

## 2022-12-20 PROCEDURE — 2709999900 HC NON-CHARGEABLE SUPPLY: Performed by: SURGERY

## 2022-12-20 PROCEDURE — 7100000001 HC PACU RECOVERY - ADDTL 15 MIN: Performed by: SURGERY

## 2022-12-20 PROCEDURE — 3700000001 HC ADD 15 MINUTES (ANESTHESIA): Performed by: SURGERY

## 2022-12-20 PROCEDURE — 7100000010 HC PHASE II RECOVERY - FIRST 15 MIN: Performed by: SURGERY

## 2022-12-20 PROCEDURE — 2580000003 HC RX 258

## 2022-12-20 PROCEDURE — 2580000003 HC RX 258: Performed by: SURGERY

## 2022-12-20 PROCEDURE — 3600000002 HC SURGERY LEVEL 2 BASE: Performed by: SURGERY

## 2022-12-20 PROCEDURE — 7100000011 HC PHASE II RECOVERY - ADDTL 15 MIN: Performed by: SURGERY

## 2022-12-20 PROCEDURE — 6360000002 HC RX W HCPCS

## 2022-12-20 RX ORDER — MIDAZOLAM HYDROCHLORIDE 1 MG/ML
INJECTION INTRAMUSCULAR; INTRAVENOUS PRN
Status: DISCONTINUED | OUTPATIENT
Start: 2022-12-20 | End: 2022-12-20 | Stop reason: SDUPTHER

## 2022-12-20 RX ORDER — ONDANSETRON 2 MG/ML
4 INJECTION INTRAMUSCULAR; INTRAVENOUS EVERY 6 HOURS PRN
Status: CANCELLED | OUTPATIENT
Start: 2022-12-20

## 2022-12-20 RX ORDER — SODIUM CHLORIDE 9 MG/ML
INJECTION, SOLUTION INTRAVENOUS PRN
Status: CANCELLED | OUTPATIENT
Start: 2022-12-20

## 2022-12-20 RX ORDER — SODIUM CHLORIDE 0.9 % (FLUSH) 0.9 %
5-40 SYRINGE (ML) INJECTION EVERY 12 HOURS SCHEDULED
Status: DISCONTINUED | OUTPATIENT
Start: 2022-12-20 | End: 2022-12-20 | Stop reason: HOSPADM

## 2022-12-20 RX ORDER — SODIUM CHLORIDE 9 MG/ML
INJECTION, SOLUTION INTRAVENOUS CONTINUOUS
Status: CANCELLED | OUTPATIENT
Start: 2022-12-20

## 2022-12-20 RX ORDER — PROPOFOL 10 MG/ML
INJECTION, EMULSION INTRAVENOUS PRN
Status: DISCONTINUED | OUTPATIENT
Start: 2022-12-20 | End: 2022-12-20 | Stop reason: SDUPTHER

## 2022-12-20 RX ORDER — BUPIVACAINE HYDROCHLORIDE AND EPINEPHRINE 5; 5 MG/ML; UG/ML
INJECTION, SOLUTION EPIDURAL; INTRACAUDAL; PERINEURAL PRN
Status: DISCONTINUED | OUTPATIENT
Start: 2022-12-20 | End: 2022-12-20 | Stop reason: ALTCHOICE

## 2022-12-20 RX ORDER — FENTANYL CITRATE 50 UG/ML
INJECTION, SOLUTION INTRAMUSCULAR; INTRAVENOUS PRN
Status: DISCONTINUED | OUTPATIENT
Start: 2022-12-20 | End: 2022-12-20 | Stop reason: SDUPTHER

## 2022-12-20 RX ORDER — ACETAMINOPHEN 325 MG/1
650 TABLET ORAL EVERY 4 HOURS PRN
Status: DISCONTINUED | OUTPATIENT
Start: 2022-12-20 | End: 2022-12-20 | Stop reason: HOSPADM

## 2022-12-20 RX ORDER — SODIUM CHLORIDE 0.9 % (FLUSH) 0.9 %
5-40 SYRINGE (ML) INJECTION PRN
Status: DISCONTINUED | OUTPATIENT
Start: 2022-12-20 | End: 2022-12-20 | Stop reason: HOSPADM

## 2022-12-20 RX ORDER — HYDROCODONE BITARTRATE AND ACETAMINOPHEN 5; 325 MG/1; MG/1
2 TABLET ORAL EVERY 4 HOURS PRN
Status: DISCONTINUED | OUTPATIENT
Start: 2022-12-20 | End: 2022-12-20 | Stop reason: HOSPADM

## 2022-12-20 RX ORDER — HYDROCODONE BITARTRATE AND ACETAMINOPHEN 5; 325 MG/1; MG/1
1 TABLET ORAL EVERY 4 HOURS PRN
Status: DISCONTINUED | OUTPATIENT
Start: 2022-12-20 | End: 2022-12-20 | Stop reason: HOSPADM

## 2022-12-20 RX ORDER — SODIUM CHLORIDE 9 MG/ML
INJECTION, SOLUTION INTRAVENOUS CONTINUOUS PRN
Status: DISCONTINUED | OUTPATIENT
Start: 2022-12-20 | End: 2022-12-20 | Stop reason: SDUPTHER

## 2022-12-20 RX ORDER — FENTANYL CITRATE 50 UG/ML
50 INJECTION, SOLUTION INTRAMUSCULAR; INTRAVENOUS EVERY 5 MIN PRN
Status: DISCONTINUED | OUTPATIENT
Start: 2022-12-20 | End: 2022-12-20 | Stop reason: HOSPADM

## 2022-12-20 RX ORDER — MEPERIDINE HYDROCHLORIDE 25 MG/ML
12.5 INJECTION INTRAMUSCULAR; INTRAVENOUS; SUBCUTANEOUS EVERY 5 MIN PRN
Status: DISCONTINUED | OUTPATIENT
Start: 2022-12-20 | End: 2022-12-20 | Stop reason: HOSPADM

## 2022-12-20 RX ORDER — ONDANSETRON 4 MG/1
4 TABLET, ORALLY DISINTEGRATING ORAL EVERY 8 HOURS PRN
Status: CANCELLED | OUTPATIENT
Start: 2022-12-20

## 2022-12-20 RX ORDER — SODIUM CHLORIDE 9 MG/ML
INJECTION, SOLUTION INTRAVENOUS PRN
Status: DISCONTINUED | OUTPATIENT
Start: 2022-12-20 | End: 2022-12-20 | Stop reason: HOSPADM

## 2022-12-20 RX ORDER — ONDANSETRON 2 MG/ML
4 INJECTION INTRAMUSCULAR; INTRAVENOUS
Status: DISCONTINUED | OUTPATIENT
Start: 2022-12-20 | End: 2022-12-20 | Stop reason: HOSPADM

## 2022-12-20 RX ORDER — SODIUM CHLORIDE 0.9 % (FLUSH) 0.9 %
5-40 SYRINGE (ML) INJECTION PRN
Status: CANCELLED | OUTPATIENT
Start: 2022-12-20

## 2022-12-20 RX ORDER — FENTANYL CITRATE 50 UG/ML
25 INJECTION, SOLUTION INTRAMUSCULAR; INTRAVENOUS EVERY 5 MIN PRN
Status: DISCONTINUED | OUTPATIENT
Start: 2022-12-20 | End: 2022-12-20 | Stop reason: HOSPADM

## 2022-12-20 RX ORDER — ROCURONIUM BROMIDE 10 MG/ML
INJECTION, SOLUTION INTRAVENOUS PRN
Status: DISCONTINUED | OUTPATIENT
Start: 2022-12-20 | End: 2022-12-20 | Stop reason: SDUPTHER

## 2022-12-20 RX ORDER — DEXAMETHASONE SODIUM PHOSPHATE 10 MG/ML
INJECTION, EMULSION INTRAMUSCULAR; INTRAVENOUS PRN
Status: DISCONTINUED | OUTPATIENT
Start: 2022-12-20 | End: 2022-12-20 | Stop reason: SDUPTHER

## 2022-12-20 RX ORDER — MORPHINE SULFATE 2 MG/ML
4 INJECTION, SOLUTION INTRAMUSCULAR; INTRAVENOUS
Status: DISCONTINUED | OUTPATIENT
Start: 2022-12-20 | End: 2022-12-20 | Stop reason: HOSPADM

## 2022-12-20 RX ORDER — SODIUM CHLORIDE 0.9 % (FLUSH) 0.9 %
5-40 SYRINGE (ML) INJECTION EVERY 12 HOURS SCHEDULED
Status: CANCELLED | OUTPATIENT
Start: 2022-12-20

## 2022-12-20 RX ORDER — MORPHINE SULFATE 2 MG/ML
2 INJECTION, SOLUTION INTRAMUSCULAR; INTRAVENOUS
Status: DISCONTINUED | OUTPATIENT
Start: 2022-12-20 | End: 2022-12-20 | Stop reason: HOSPADM

## 2022-12-20 RX ORDER — ONDANSETRON 2 MG/ML
INJECTION INTRAMUSCULAR; INTRAVENOUS PRN
Status: DISCONTINUED | OUTPATIENT
Start: 2022-12-20 | End: 2022-12-20 | Stop reason: SDUPTHER

## 2022-12-20 RX ORDER — SUCCINYLCHOLINE CHLORIDE 20 MG/ML
INJECTION INTRAMUSCULAR; INTRAVENOUS PRN
Status: DISCONTINUED | OUTPATIENT
Start: 2022-12-20 | End: 2022-12-20 | Stop reason: SDUPTHER

## 2022-12-20 RX ORDER — HYDROCODONE BITARTRATE AND ACETAMINOPHEN 5; 325 MG/1; MG/1
1 TABLET ORAL EVERY 6 HOURS PRN
Qty: 12 TABLET | Refills: 0 | Status: SHIPPED | OUTPATIENT
Start: 2022-12-20 | End: 2022-12-23

## 2022-12-20 RX ORDER — SODIUM CHLORIDE 9 MG/ML
INJECTION, SOLUTION INTRAVENOUS CONTINUOUS
Status: DISCONTINUED | OUTPATIENT
Start: 2022-12-20 | End: 2022-12-20 | Stop reason: HOSPADM

## 2022-12-20 RX ADMIN — Medication 180 MG: at 14:43

## 2022-12-20 RX ADMIN — FENTANYL CITRATE 75 MCG: 50 INJECTION, SOLUTION INTRAMUSCULAR; INTRAVENOUS at 14:43

## 2022-12-20 RX ADMIN — SODIUM CHLORIDE: 9 INJECTION, SOLUTION INTRAVENOUS at 12:14

## 2022-12-20 RX ADMIN — DEXAMETHASONE SODIUM PHOSPHATE 10 MG: 10 INJECTION, EMULSION INTRAMUSCULAR; INTRAVENOUS at 14:46

## 2022-12-20 RX ADMIN — ONDANSETRON 4 MG: 2 INJECTION INTRAMUSCULAR; INTRAVENOUS at 14:43

## 2022-12-20 RX ADMIN — ROCURONIUM BROMIDE 30 MG: 50 INJECTION, SOLUTION INTRAVENOUS at 14:50

## 2022-12-20 RX ADMIN — SUGAMMADEX 200 MG: 100 INJECTION, SOLUTION INTRAVENOUS at 14:59

## 2022-12-20 RX ADMIN — SODIUM CHLORIDE: 9 INJECTION, SOLUTION INTRAVENOUS at 14:40

## 2022-12-20 RX ADMIN — Medication 3000 MG: at 14:46

## 2022-12-20 RX ADMIN — FENTANYL CITRATE 50 MCG: 50 INJECTION, SOLUTION INTRAMUSCULAR; INTRAVENOUS at 14:51

## 2022-12-20 RX ADMIN — ROCURONIUM BROMIDE 10 MG: 50 INJECTION, SOLUTION INTRAVENOUS at 14:43

## 2022-12-20 RX ADMIN — PROPOFOL 200 MG: 10 INJECTION, EMULSION INTRAVENOUS at 14:43

## 2022-12-20 RX ADMIN — MIDAZOLAM 2 MG: 1 INJECTION INTRAMUSCULAR; INTRAVENOUS at 14:40

## 2022-12-20 ASSESSMENT — PAIN SCALES - GENERAL
PAINLEVEL_OUTOF10: 3
PAINLEVEL_OUTOF10: 0
PAINLEVEL_OUTOF10: 3

## 2022-12-20 NOTE — ANESTHESIA PRE PROCEDURE
Department of Anesthesiology  Preprocedure Note       Name:  Juan Montemayor   Age:  36 y.o.  :  1980                                          MRN:  [id number]         Date:  2022      Surgeon: Stefany Toth):  Noa Taylor MD    Procedure: Procedure(s):  EXCISION ABDOMINAL WALL MASS, POSS HERNIA REPAIR    Medications prior to admission:   Prior to Admission medications    Medication Sig Start Date End Date Taking?  Authorizing Provider   busPIRone (BUSPAR) 30 MG tablet take 1/2 tablet by mouth twice a day 9/15/22   JOHN Medrano CNP   omeprazole (PRILOSEC) 40 MG delayed release capsule take 1 capsule by mouth once daily 9/15/22   JOHN Medrano CNP   DULoxetine (CYMBALTA) 30 MG extended release capsule Take 1 capsule by mouth 3 times daily 9/15/22   Chema JOHN Osorio - CNP   azaTHIOprine (IMURAN) 50 MG tablet take 3 tablets by mouth once daily 21   Historical Provider, MD   celecoxib (CELEBREX) 100 MG capsule take 1 capsule by mouth twice a day if needed for pain 21   Historical Provider, MD   lamoTRIgine (LAMICTAL) 200 MG tablet take 1 tablet by mouth once daily 21   Historical Provider, MD   NUCALA 100 MG/ML SOAJ injection  21   Historical Provider, MD   predniSONE (DELTASONE) 5 MG tablet take 1 tablet by mouth once daily 21   Historical Provider, MD   albuterol (PROVENTIL) (2.5 MG/3ML) 0.083% nebulizer solution inhale contents of 1 vial ( 3 milliliters ) in nebulizer by mouth and INTO THE LUNGS every 6 hours if needed for wheezing 20   JOHN Medrano CNP   budesonide-formoterol (SYMBICORT) 160-4.5 MCG/ACT AERO Inhale 2 puffs into the lungs 2 times daily 20   Eleanor Rodriguez MD   cetirizine (ZYRTEC) 10 MG tablet Take 10 mg by mouth daily    Historical Provider, MD   Albuterol Sulfate (PROAIR HFA IN) Inhale 1 puff into the lungs as needed    Historical Provider, MD       Current medications:    Current Facility-Administered Medications   Medication Dose Route Frequency Provider Last Rate Last Admin    0.9 % sodium chloride infusion   IntraVENous Continuous Latasha Bellamy  mL/hr at 22 1214 New Bag at 22 1214    sodium chloride flush 0.9 % injection 5-40 mL  5-40 mL IntraVENous 2 times per day Latasha Bellamy MD        sodium chloride flush 0.9 % injection 5-40 mL  5-40 mL IntraVENous PRN Latasha Bellamy MD        0.9 % sodium chloride infusion   IntraVENous PRN Latasha Bellamy MD        ceFAZolin (ANCEF) 3000 mg in dextrose 5 % 100 mL IVPB  3,000 mg IntraVENous On Call to [address] [place] [address] [place], [place]           Allergies: Allergies   Allergen Reactions    Cat Hair Extract     Ibuprofen      Shortness of breath, asthma like symptoms    Seasonal        Problem List:    Patient Active Problem List   Diagnosis Code    Respiratory failure ([facility] [address] J96.90    Reactive airway disease with wheezing J45.909    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) ([facility]) M30.1    Abdominal wall mass R22.2       Past Medical History:        Diagnosis Date    Anxiety     Chronic rhinosinusitis     Depression     Hypercholesteremia     Vocal cord dysfunction        Past Surgical History:        Procedure Laterality Date    SINUS SURGERY  2016   [facility] SINUS SURGERY      [place]       Social History:    Social History     Tobacco Use    Smoking status: Former     Packs/day: 1.00     Years: 14.00     Pack years: 14.00     Types: Cigarettes     Start date:      Quit date:      Years since quittin.9    Smokeless tobacco: Never   Substance Use Topics    Alcohol use:  Yes                                Counseling given: Not Answered      Vital Signs (Current):   Vitals:    22 1146   BP: (!) 149/85   Pulse: 67   Resp: 16   Temp: 96.8 °F (36 °C)   TempSrc: Temporal   SpO2: 93%   Weight: (!) 305 lb 9.6 oz (138.6 kg)   Height: 6' 2\" (1.88 m)                                              BP Readings from Last 3 Encounters: 12/20/22 (!) 149/85   12/12/22 128/80   11/29/22 124/70       NPO Status: Time of last liquid consumption: 2200                        Time of last solid consumption: 2200                        Date of last liquid consumption: 12/19/22                        Date of last solid food consumption: 12/19/22    BMI:   Wt Readings from Last 3 Encounters:   12/20/22 (!) 305 lb 9.6 oz (138.6 kg)   12/12/22 (!) 303 lb (137.4 kg)   11/29/22 (!) 307 lb (139.3 kg)     Body mass index is 39.24 kg/m². CBC:   Lab Results   Component Value Date/Time    WBC 5.2 12/14/2022 08:14 AM    RBC 5.34 12/14/2022 08:14 AM    HGB 15.1 12/14/2022 08:14 AM    HCT 45.8 12/14/2022 08:14 AM    MCV 85.8 12/14/2022 08:14 AM     12/14/2022 08:14 AM       CMP:   Lab Results   Component Value Date/Time     12/14/2022 08:14 AM    K 4.4 12/14/2022 08:14 AM    K 4.1 09/10/2020 11:29 PM     12/14/2022 08:14 AM    CO2 26 12/14/2022 08:14 AM    BUN 15 12/14/2022 08:14 AM    CREATININE 1.3 12/14/2022 08:14 AM    LABGLOM >60 12/14/2022 08:14 AM    GLUCOSE 91 12/14/2022 08:14 AM    PROT 6.8 12/14/2022 08:14 AM    CALCIUM 9.6 12/14/2022 08:14 AM    BILITOT 0.2 12/14/2022 08:14 AM    ALKPHOS 79 12/14/2022 08:14 AM    AST 17 12/14/2022 08:14 AM    ALT 15 12/14/2022 08:14 AM       POC Tests: No results for input(s): POCGLU, POCNA, POCK, POCCL, POCBUN, POCHEMO, POCHCT in the last 72 hours.     Coags:   Lab Results   Component Value Date/Time    INR 0.97 09/10/2020 11:29 PM    APTT 33.1 09/10/2020 11:29 PM       HCG (If Applicable): No results found for: PREGTESTUR, PREGSERUM, HCG, HCGQUANT     ABGs: No results found for: PHART, PO2ART, WHV1XMW, RZX7DNW, BEART, S4LWIKPE     Type & Screen (If Applicable):  No results found for: LABABO, LABRH    Drug/Infectious Status (If Applicable):  No results found for: HIV, HEPCAB    COVID-19 Screening (If Applicable):   Lab Results   Component Value Date/Time    COVID19 Detected 12/08/2020 04:16 PM Anesthesia Evaluation  Patient summary reviewed and Nursing notes reviewed no history of anesthetic complications:   Airway: Mallampati: III  TM distance: >3 FB   Neck ROM: full  Mouth opening: > = 3 FB   Dental:          Pulmonary:Negative Pulmonary ROS and normal exam  breath sounds clear to auscultation                             Cardiovascular:Negative CV ROS  Exercise tolerance: good (>4 METS),                     Neuro/Psych:   (+) psychiatric history:            GI/Hepatic/Renal:   (+) morbid obesity          Endo/Other: Negative Endo/Other ROS             Pt had no PAT visit       Abdominal:   (+) obese,     Abdomen: soft. Vascular: negative vascular ROS. Other Findings:           Anesthesia Plan      general     ASA 3       Induction: intravenous. MIPS: Postoperative opioids intended and Prophylactic antiemetics administered. Anesthetic plan and risks discussed with patient. Plan discussed with CRNA.                     Karin Byers DO   12/20/2022

## 2022-12-20 NOTE — H&P
[address] [address]  History and Physical Update    Pt Name: Keri Thapa  MRN: [id number]  YOB: 1980  Date of evaluation: 12/20/2022    [x] I have examined the patient and reviewed the H&P/Consult and there are no changes to the patient or plans. [] I have examined the patient and reviewed the H&P/Consult and have noted the following changes:        Girma Rai MD MD  Electronically signed 12/20/2022 at 1:32 PM   Girma Rai MD   General Surgery  New Patient Evaluation in Office  Pt Name: Keri Thapa  Date of Birth 1980   Today's Date: 12/12/2022  Medical Record Number: [id number]  Referring Provider: JOHN Barth -*  Primary Care Provider: JOHN Warren - ANASTASIA  Chief Complaint:       Chief Complaint   Patient presents with    Surgical Consult       New patient-referred by Urszula Castillo CNP - Ventral hernia         ASSESSMENT   1. Abdominal wall mass    2. 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 Suspect soft tissue mass versus hernia. Could offer additional imaging versus just surgical exploration and excision/repair. Patient desires to proceed with primary surgical route. However, he may be out of network at [facility] ([facility] He states he will discuss with his wife. If he wishes to proceed with surgery here with higher out-of-pocket cost we will be happy to proceed. Techniques of surgery discussed. Risks include but not limited to bleeding, infection, need for possible mesh repair of hernia identified as well as recurrence of hernia, scar and pain. Potential for poor wound healing on steroids discussed as well. Patient expressed understanding and will contact our office if he wishes to proceed with surgery here. Pulmonary records reviewed from [facility].          Amanda Campbell is a 36y.o. year old male who is presenting today in the office for evaluation of a possible abdominal wall hernia. He is felt a mass in his abdominal wall its been there for some time but it is getting bigger and it is concerning to him. Its in the left upper quadrant about 8 cm above the umbilicus lateral to the midline. At least 6 cm in diameter and feels deeper either within the subcutaneous fat or possibly within the rectus sheath. It is not reducible. Joaquim Burrell denies prior abdominal surgeries. He desires excision. Patient follows with pulmonary medicine at [facility]. He has no pulmonary symptoms on current medical regimen.         Past Medical History  Past Medical History        Past Medical History:   Diagnosis Date    Anxiety      Chronic rhinosinusitis      Depression      Hypercholesteremia      Vocal cord dysfunction            Past Surgical History  Past Surgical History         Past Surgical History:   Procedure Laterality Date    SINUS SURGERY   12/01/2016    SINUS SURGERY   2021     [facility]          Medications  Current Facility-Administered Medications          Current Outpatient Medications   Medication Sig Dispense Refill    busPIRone (BUSPAR) 30 MG tablet take 1/2 tablet by mouth twice a day 90 tablet 1    omeprazole (PRILOSEC) 40 MG delayed release capsule take 1 capsule by mouth once daily 90 capsule 3    DULoxetine (CYMBALTA) 30 MG extended release capsule Take 1 capsule by mouth 3 times daily 270 capsule 1    azaTHIOprine (IMURAN) 50 MG tablet take 3 tablets by mouth once daily        celecoxib (CELEBREX) 100 MG capsule take 1 capsule by mouth twice a day if needed for pain        NUCALA 100 MG/ML SOAJ injection          predniSONE (DELTASONE) 5 MG tablet take 1 tablet by mouth once daily        albuterol (PROVENTIL) (2.5 MG/3ML) 0.083% nebulizer solution inhale contents of 1 vial ( 3 milliliters ) in nebulizer by mouth and INTO THE LUNGS every 6 hours if needed for wheezing 375 mL 3    budesonide-formoterol (SYMBICORT) 160-4.5 MCG/ACT AERO Inhale 2 puffs into the lungs 2 times daily 1 Inhaler 3    cetirizine (ZYRTEC) 10 MG tablet Take 10 mg by mouth daily        Albuterol Sulfate (PROAIR HFA IN) Inhale 1 puff into the lungs as needed        lamoTRIgine (LAMICTAL) 200 MG tablet take 1 tablet by mouth once daily          No current facility-administered medications for this visit.          Allergies           Allergies   Allergen Reactions    Cat Hair Extract      Ibuprofen         Shortness of breath, asthma like symptoms    Seasonal         Family History  Family History         Family History   Problem Relation Age of Onset    No Known Problems Mother      No Known Problems Father      No Known Problems Sister      No Known Problems Sister            SocialHistory  Social History               Socioeconomic History    Marital status:        Spouse name: Not on file    Number of children: Not on file    Years of education: Not on file    Highest education level: Not on file   Occupational History    Not on file   Tobacco Use    Smoking status: Former       Packs/day: 1.00       Years: 14.00       Pack years: 14.00       Types: Cigarettes       Start date:        Quit date:        Years since quittin.9    Smokeless tobacco: Never   Vaping Use    Vaping Use: Former    Substances: Nicotine   Substance and Sexual Activity    Alcohol use: Yes    Drug use: Never    Sexual activity: Not on file   Other Topics Concern    Not on file   Social History Narrative    Not on file      Social Determinants of Health          Financial Resource Strain: Low Risk     Difficulty of Paying Living Expenses: Not hard at all   Food Insecurity: No Food Insecurity    Worried About Running Out of Food in the Last Year: Never true    Ran Out of Food in the Last Year: Never true   Transportation Needs: Not on file   Physical Activity: Not on file   Stress: Not on file   Social Connections: Not on file   Intimate Partner Violence: Not on file   Housing Stability: Not on file              Review of Systems  Review of Systems   Constitutional:  Negative for chills, fatigue, fever and unexpected weight change. HENT:  Negative for sore throat, trouble swallowing and voice change. Eyes:  Negative for visual disturbance. Respiratory:  Negative for cough, shortness of breath and wheezing. Cardiovascular:  Negative for chest pain and palpitations. Gastrointestinal:  Positive for abdominal distention. Negative for abdominal pain, blood in stool, nausea and vomiting. Endocrine: Negative for cold intolerance, heat intolerance and polydipsia. Genitourinary:  Negative for dysuria, flank pain and hematuria. Musculoskeletal:  Negative for gait problem, joint swelling and myalgias. Skin:  Negative for color change and rash. Allergic/Immunologic: Negative for immunocompromised state. Neurological:  Negative for dizziness, tremors, seizures and speech difficulty. Hematological:  Does not bruise/bleed easily. Psychiatric/Behavioral:  Negative for behavioral problems, confusion and suicidal ideas. OBJECTIVE      /80 (Site: Left Upper Arm, Position: Sitting, Cuff Size: Large Adult)   Pulse 87   Temp 97.6 °F (36.4 °C)   Ht 6' 2\" (1.88 m)   Wt (!) 303 lb (137.4 kg)   SpO2 96%   BMI 38.90 kg/m²       Physical Exam  Vitals reviewed. Constitutional:       Appearance: Normal appearance. He is well-developed. He is obese. He is not diaphoretic. HENT:      Head: Normocephalic and atraumatic. Nose: No congestion. Eyes:      General: No scleral icterus. Extraocular Movements: Extraocular movements intact. Pupils: Pupils are equal, round, and reactive to light. Neck:      Thyroid: No thyromegaly. Vascular: No JVD. Trachea: No tracheal deviation. Cardiovascular:      Rate and Rhythm: Normal rate and regular rhythm. Heart sounds: Normal heart sounds. No murmur heard.   Pulmonary:      Effort: Pulmonary effort is normal. No respiratory distress. Breath sounds: Normal breath sounds. No wheezing. Abdominal:      General: There is no distension. Palpations: Abdomen is soft. There is mass. Tenderness: There is no abdominal tenderness. There is no guarding or rebound. Hernia: No hernia is present. Musculoskeletal:         General: No tenderness or deformity. Cervical back: Neck supple. No rigidity. Lymphadenopathy:      Cervical: No cervical adenopathy. Skin:     General: Skin is warm and dry. Coloration: Skin is not jaundiced or pale. Findings: No rash. Neurological:      General: No focal deficit present. Mental Status: He is alert and oriented to person, place, and time. Cranial Nerves: No cranial nerve deficit.    Psychiatric:         Mood and Affect: Mood normal.         Behavior: Behavior normal.               Lab Results   Component Value Date     WBC 5.0 10/06/2022     HGB 14.5 10/06/2022     HCT 43.7 10/06/2022      10/06/2022     ALT 22 10/06/2022     AST 30 10/06/2022      10/06/2022     K 4.3 10/06/2022      10/06/2022     CREATININE 1.3 (H) 10/06/2022     BUN 18 10/06/2022     CO2 26 10/06/2022     TSH 0.983 09/29/2021     INR 0.97 09/10/2020      No pertinent imaging

## 2022-12-20 NOTE — PROGRESS NOTES
Pt has met discharge criteria and states he is ready for discharge to home. IV removed, gauze and tape applied. Dressed in own clothes and personal belongings gathered. Discharge instructions (with opioid medication education information) given to pt and family; pt and family verbalized understanding of discharge instructions, prescriptions and follow up appointments. Pt transported to discharge lobby by [facility] staff.

## 2022-12-20 NOTE — DISCHARGE INSTRUCTIONS
DR Magy Parker DISCHARGE INSTRUCTIONS    Pt Name: Darryle Baron  Medical Record Number: [id number]  Today's Date: 12/20/2022    GENERAL ANESTHESIA OR SEDATION  1. Do not drive or operate hazardous machinery for 24 hours. 2. Do not make important business or personal decisions for 24 hours. 3. Do not drink alcoholic beverages or use tobacco for 24 hours. ACTIVITY INSTRUCTIONS:  [] Rest today. Resume light to normal activity tomorrow.   [] You may resume normal activity tomorrow. Do not engage in strenuous activity that may place stress on your incision. [x] Do not drive for 3-5 days and avoid heavy lifting, tugging, pullings greater than 10-20 lbs until seen in the office. DIET INSTRUCTIONS:  []Begin with clear liquids. If not nauseated, may increase to a low-fat diet when you desire. Greasy and spicy foods are not advised. [x]Regular diet as tolerated. []Other:     MEDICATIONS  [x]Prescription sent with you to be used as directed. []Lortab   [x]Norco   []Percocet   []Tylenol #3   []Oxycontin   Do not drink alcohol or drive while taking these medications. You may experience dizziness or drowsiness with these medications. You may also experience constipation which can be relieved with stool softners or laxatives. [x]You may resume your daily prescription medication schedule unless otherwise specified. [x]Do not take 325mg Aspirin or other blood thinners such as Coumadin or Plavix for 5 days. WOUND/DRESSING INSTRUCTIONS:  Always ensure you and your care giver clean hands before and after caring for the wound. [] Keep dressing clean and dry for 48 hours. Change when soiled or wet. [] Allow steri-strips to fall off on their own.   [] Ice operative site for 20 minutes 4 times a day. [x] May wash over incision in shower in 24 hours, but do not soak in a bath.  [] Take sitz bath for 20 minutes twice daily and after bowel movements. [] Keep the abdominal binder in place during the day.  May remove to shower and at night.  [] Remove packing from wound in 24 hours and replace with AMD dressings daily. [] Empty GINYN drain daily and record the amounts. BREAST PROCEDURES  []Following a breast procedure, it is important to continue to where supportive garments. []Following a sentinal lymph node biopsy, you should not be alarmed if your urine has a blue color to it. This is your body eliminating the dye used for the procedure. ABDOMINAL/LAPAROSCOPIC SURGERY  [x]You are encouraged to get up and move around as this helps with the circulation and speeds up the healing process. [x]Breath deeply and cough from time to time. This helps to clear your lungs and helps prevent pneumonia. []Supporting your incision with a pillow or your hand helps to minimize discomfort and pain. []Laparoscopic patients may develop shoulder pain in the first 48 hours from the gas used during the procedure. FOLLOW-UP CARE. SPECIFICALLY WATCH FOR:   Fever over 101 degrees by mouth   Increased redness, warmth, hardness at operative site. Blood soaked dressing (small amounts of oozing may be normal.)   Increased or progressive drainage from the surgical area   Inability to urinate or blood in the urine   Pain not relieved by the medications ordered   Persistent nausea and/or vomiting, unable to retain fluids. FOLLOW-UP APPOINTMENT   []1 week   [x]2 weeks- 3 weeks   []Other    Call my office if you have any problem that concerns you [phone]. After hours, you can reach the answering service via the office phone number. IF YOU NEED IMMEDIATE ATTENTION, GO TO THE EMERGENCY ROOM AND YOUR DOCTOR WILL BE CONTACTED. Jennifer Weiss MD MD  [address] [place] [phone]  [facility] SHANNON JONES II.RISHI, [facility]  Electronically signed 12/20/2022 at 3:02 PM

## 2022-12-20 NOTE — PROGRESS NOTES
1508: Pt arrives to pacu, awakens to verbal stimuli and denies pain. Place on NC upon arrival, respirations easy and unlabored. VSS  1520: Pt sleeping at this time, easily awakens to voice. Continues to deny pain  1538: Pt meets criteria for discharge from pacu, transported to [facility] in stable condition.  VSS

## 2022-12-20 NOTE — PROGRESS NOTES
Pt returned to [facility] room 6. Vitals and assessment as charted. 0.9 infusing, @700 ml to count from PACU. Pt has muffin and water. Family at the bedside. Pt and family verbalized understanding of discharge criteria and call light use. Call light in reach.

## 2022-12-20 NOTE — PROGRESS NOTES
Patient admitted to [facility] room 6 with wife at bedside. Bed in low position side rails up call light in reach. Patient denies questions at this time.

## 2022-12-20 NOTE — ANESTHESIA POSTPROCEDURE EVALUATION
Department of Anesthesiology  Postprocedure Note    Patient: Licha Barber  MRN: [id number]  YOB: 1980  Date of evaluation: 12/20/2022      Procedure Summary     Date: 12/20/22 Room / Location: [address] CHERYL Johnson    Anesthesia Start: 0430 Anesthesia Stop: 9371    Procedure: EXCISION ABDOMINAL WALL SOFT TISS:UE LIPOMA (Abdomen) Diagnosis:       Abdominal wall mass      (Abdominal wall mass [R22.2])    Surgeons: Isatu David MD Responsible Provider: [address] [place] [address],     Anesthesia Type: general ASA Status: 3          Anesthesia Type: No value filed.     Mook Phase I: Mook Score: 8    Mook Phase II:        Anesthesia Post Evaluation    Patient location during evaluation: PACU  Patient participation: complete - patient participated  Level of consciousness: awake and alert  Pain score: 1  Airway patency: patent  Nausea & Vomiting: no nausea and no vomiting  Complications: no  Cardiovascular status: hemodynamically stable and blood pressure returned to baseline  Respiratory status: spontaneous ventilation, room air and acceptable  Hydration status: stable

## 2022-12-21 ENCOUNTER — TELEPHONE (OUTPATIENT)
Dept: SURGERY | Age: 42
End: 2022-12-21

## 2022-12-21 NOTE — TELEPHONE ENCOUNTER
S/p Excision 8 cm subcutaneous soft tissue mass left upper quadrant abdominal wall-12/20/22  Called patient post op no answer left voicemail with appropriate call back number.

## 2023-01-09 ENCOUNTER — OFFICE VISIT (OUTPATIENT)
Dept: SURGERY | Age: 43
End: 2023-01-09

## 2023-01-09 VITALS
WEIGHT: 315 LBS | SYSTOLIC BLOOD PRESSURE: 122 MMHG | OXYGEN SATURATION: 97 % | RESPIRATION RATE: 18 BRPM | HEIGHT: 74 IN | DIASTOLIC BLOOD PRESSURE: 68 MMHG | BODY MASS INDEX: 40.43 KG/M2 | HEART RATE: 72 BPM | TEMPERATURE: 97.2 F

## 2023-01-09 DIAGNOSIS — R22.2 ABDOMINAL WALL MASS: Primary | ICD-10-CM

## 2023-01-09 DIAGNOSIS — Z09 POSTOP CHECK: ICD-10-CM

## 2023-01-09 PROCEDURE — 99024 POSTOP FOLLOW-UP VISIT: CPT | Performed by: SURGERY

## 2023-01-09 NOTE — PROGRESS NOTES
Jourdan Riojas MD   General Surgery  Postprocedure Evaluation in Office  Pt Name: Maggy Mcdowell  Date of Birth 1980   Today's Date: 1/9/2023  Medical Record Number: [id number]  Primary Care Provider: JOHN Gibbons CNP  Chief Complaint   Patient presents with    Post-Op Check     s/p Excision 8 cm subcutaneous soft tissue mass left upper quadrant abdominal wall-12/20/22       ASSESSMENT      1. Abdominal wall mass    2. Postop check         PLAN       1. Pathology reviewed, benign. 2.  Surgical incision well-healed. Patient has no complaints. Follow-up surgery as needed. Call with any questions or concerns. 3.  Activity as tolerated. No restrictions. Luis Enrique Song is seen today for post-op follow-up. He is 3 week(s) status post vision of an 8 cm abdominal wall lipoma. Ros[place] Mike has no wound issues. Surgical wound is well-healed. Pathology benign lipoma.   Medications    Current Outpatient Medications:     busPIRone (BUSPAR) 30 MG tablet, take 1/2 tablet by mouth twice a day, Disp: 90 tablet, Rfl: 1    omeprazole (PRILOSEC) 40 MG delayed release capsule, take 1 capsule by mouth once daily, Disp: 90 capsule, Rfl: 3    DULoxetine (CYMBALTA) 30 MG extended release capsule, Take 1 capsule by mouth 3 times daily, Disp: 270 capsule, Rfl: 1    azaTHIOprine (IMURAN) 50 MG tablet, take 3 tablets by mouth once daily, Disp: , Rfl:     celecoxib (CELEBREX) 100 MG capsule, take 1 capsule by mouth twice a day if needed for pain, Disp: , Rfl:     NUCALA 100 MG/ML SOAJ injection, , Disp: , Rfl:     predniSONE (DELTASONE) 5 MG tablet, take 1 tablet by mouth once daily, Disp: , Rfl:     albuterol (PROVENTIL) (2.5 MG/3ML) 0.083% nebulizer solution, inhale contents of 1 vial ( 3 milliliters ) in nebulizer by mouth and INTO THE LUNGS every 6 hours if needed for wheezing, Disp: 375 mL, Rfl: 3    budesonide-formoterol (SYMBICORT) 160-4.5 MCG/ACT AERO, Inhale 2 puffs into the lungs 2 times daily, Disp: 1 Inhaler, Rfl: 3    cetirizine (ZYRTEC) 10 MG tablet, Take 10 mg by mouth daily, Disp: , Rfl:     Albuterol Sulfate (PROAIR HFA IN), Inhale 1 puff into the lungs as needed, Disp: , Rfl:     Allergies  Allergies   Allergen Reactions    Cat Hair Extract     Ibuprofen      Shortness of breath, asthma like symptoms    Seasonal        Review of Systems  History obtained from the patient. Constitutional: Denies any fever, chills, fatigue. Wound: Denies any rash, skin color changes or wound problems. Resp: Denies any cough, shortness of breath. CV: Denies any chest pain, orthopnea or syncope. GI: Positive for incisional discomfort only. Denies any nausea, vomiting, blood in the stool, constipation or diarrhea. : Denies any hematuria, hesitancy or dysuria. OBJECTIVE     VITALS: /68 (Site: Right Upper Arm, Position: Sitting, Cuff Size: Medium Adult)   Pulse 72   Temp 97.2 °F (36.2 °C) (Temporal)   Resp 18   Ht 6' 2\" (1.88 m)   Wt (!) 317 lb 12.8 oz (144.2 kg)   SpO2 97%   BMI 40.80 kg/m²     CONSTITUTIONAL: Alert and oriented times 3, no acute distress and cooperative to examination. SKIN: Skin color, texture, turgor normal. No rashes or lesions. INCISION: wound margins intact and healing well. No signs of infection. No drainage. LUNGS: Lungs Clear  CARDIOVASCULAR: Normal Rate  ABDOMEN:soft , nontender         Narrative  Performed by: [facility]. Pathology      Yuli Acosta                  [id number]   Assoc.                                               Page 1 of EXO5 , [address]                                                       PROC: 2022   [facility]                                    RECV: 2022   [address]                                    RPTD: 2022   [facility], [address]                       MRN:  [id number]     LOC: OR                       ACCT: [de-identified]  SEX: M                       : 1980  AGE: 36 Y PATHOLOGY REPORT                       ATTN: [facility]                       REQ: [facility]       Copies To:   Emile Valdez       Clinical Information: ABDOMINAL WALL MASS [R22.2]     FINAL DIAGNOSIS:   Soft tissue, abdomen, excision:     Lipoma. Specimen:   LIPOMA, ABDOMINAL SOFT TISSUE       Gross Examination:   The container is labeled Trell Cheatham, abdominal soft tissue lipoma. Received in formalin is a shaggy fragment of yellow adipose tissue   measuring 7.5 x 3.7 x 3.5 cm. Sections through the specimen reveal a   yellow fatty cut surface. There is no hemorrhage or necrosis. Representative sections are submitted. 2 ss. ALP/DKR:[id number]     Microscopic Examination:   Microscopic examination was performed. [id number]                                                       <Sign Out EMILY Lentz.[email or web address]., F.C.A.P.        [facility]/ [address] [address]  Printed on:  12/22/2022   Corin Naranjo [address] [place] [address], [employer] [facility]   Original print date: 12/22/2022      Specimen Collected: 12/20/22 06:51 EST Last Resulted: 12/22/22 10:48 EST

## 2023-02-21 ENCOUNTER — PATIENT MESSAGE (OUTPATIENT)
Dept: FAMILY MEDICINE CLINIC | Age: 43
End: 2023-02-21

## 2023-02-21 DIAGNOSIS — R79.89 LOW TESTOSTERONE: Primary | ICD-10-CM

## 2023-02-21 NOTE — TELEPHONE ENCOUNTER
From: Kelly Armando  To: Chema Osorio  Sent: 2/21/2023 1:37 PM EST  Subject: Endocrinologist     Kahlil Bear, I received a call from your office a while ago about referring me to an endocrinologist as a result of my low counts. Sorry for the delay but I'd like to move forward with this. Could you let me know what my next step is?

## 2023-02-27 RX ORDER — BUSPIRONE HYDROCHLORIDE 30 MG/1
TABLET ORAL
Qty: 90 TABLET | Refills: 3 | Status: SHIPPED | OUTPATIENT
Start: 2023-02-27

## 2023-03-22 ENCOUNTER — PATIENT MESSAGE (OUTPATIENT)
Dept: FAMILY MEDICINE CLINIC | Age: 43
End: 2023-03-22

## 2023-03-22 RX ORDER — VALACYCLOVIR HYDROCHLORIDE 1 G/1
2000 TABLET, FILM COATED ORAL 2 TIMES DAILY
Qty: 20 TABLET | Refills: 1 | Status: SHIPPED | OUTPATIENT
Start: 2023-03-22 | End: 2023-03-22

## 2023-03-22 NOTE — TELEPHONE ENCOUNTER
Last visit- 11/29/2022  Next visit- Visit date not found    Requested Prescriptions     Pending Prescriptions Disp Refills    valACYclovir (VALTREX) 1 g tablet 20 tablet 1     Sig: Take 2 tablets by mouth 2 times daily

## 2023-03-22 NOTE — TELEPHONE ENCOUNTER
From: Twila Zavala  To: Chema Osorio  Sent: 3/22/2023 2:53 PM EDT  Subject: Refill    Hi Chema, could please have a valtrex refill sent to [facility] ([facility] Thanks!

## 2023-03-24 ENCOUNTER — NURSE ONLY (OUTPATIENT)
Dept: LAB | Age: 43
End: 2023-03-24

## 2023-03-24 DIAGNOSIS — R79.89 LOW TESTOSTERONE IN MALE: ICD-10-CM

## 2023-03-24 LAB
25(OH)D3 SERPL-MCNC: 23 NG/ML (ref 30–100)
ALBUMIN SERPL BCG-MCNC: 4.5 G/DL (ref 3.5–5.1)
ALP SERPL-CCNC: 65 U/L (ref 38–126)
ALT SERPL W/O P-5'-P-CCNC: 26 U/L (ref 11–66)
ANION GAP SERPL CALC-SCNC: 10 MEQ/L (ref 8–16)
AST SERPL-CCNC: 30 U/L (ref 5–40)
BACTERIA: NORMAL
BASOPHILS ABSOLUTE: 0 THOU/MM3 (ref 0–0.1)
BASOPHILS NFR BLD AUTO: 1.1 %
BILIRUB SERPL-MCNC: 0.5 MG/DL (ref 0.3–1.2)
BILIRUB UR QL STRIP: NEGATIVE
BUN SERPL-MCNC: 15 MG/DL (ref 7–22)
CALCIUM SERPL-MCNC: 9.1 MG/DL (ref 8.5–10.5)
CASTS #/AREA URNS LPF: NORMAL /LPF
CASTS #/AREA URNS LPF: NORMAL /LPF
CHARACTER UR: CLEAR
CHARCOAL URNS QL MICRO: NORMAL
CHLORIDE SERPL-SCNC: 104 MEQ/L (ref 98–111)
CO2 SERPL-SCNC: 25 MEQ/L (ref 23–33)
COLOR UR: YELLOW
CREAT SERPL-MCNC: 1.2 MG/DL (ref 0.4–1.2)
CRP SERPL-MCNC: < 0.3 MG/DL (ref 0–1)
CRYSTALS URNS QL MICRO: NORMAL
DEPRECATED RDW RBC AUTO: 37.6 FL (ref 35–45)
EOSINOPHIL NFR BLD AUTO: 1.1 %
EOSINOPHILS ABSOLUTE: 0 THOU/MM3 (ref 0–0.4)
EPITHELIAL CELLS, UA: NORMAL /HPF
ERYTHROCYTE [DISTWIDTH] IN BLOOD BY AUTOMATED COUNT: 12.7 % (ref 11.5–14.5)
ERYTHROCYTE [SEDIMENTATION RATE] IN BLOOD BY WESTERGREN METHOD: 5 MM/HR (ref 0–10)
GFR SERPL CREATININE-BSD FRML MDRD: > 60 ML/MIN/1.73M2
GLUCOSE SERPL-MCNC: 132 MG/DL (ref 70–108)
GLUCOSE UR QL STRIP.AUTO: NEGATIVE MG/DL
HCT VFR BLD AUTO: 44.3 % (ref 42–52)
HGB BLD-MCNC: 14.8 GM/DL (ref 14–18)
HGB UR QL STRIP.AUTO: NEGATIVE
IMM GRANULOCYTES # BLD AUTO: 0.03 THOU/MM3 (ref 0–0.07)
IMM GRANULOCYTES NFR BLD AUTO: 0.7 %
KETONES UR QL STRIP.AUTO: NEGATIVE
LEUKOCYTE ESTERASE UR QL STRIP.AUTO: NEGATIVE
LYMPHOCYTES ABSOLUTE: 0.8 THOU/MM3 (ref 1–4.8)
LYMPHOCYTES NFR BLD AUTO: 17.5 %
MCH RBC QN AUTO: 27.6 PG (ref 26–33)
MCHC RBC AUTO-ENTMCNC: 33.4 GM/DL (ref 32.2–35.5)
MCV RBC AUTO: 82.5 FL (ref 80–94)
MONOCYTES ABSOLUTE: 0.3 THOU/MM3 (ref 0.4–1.3)
MONOCYTES NFR BLD AUTO: 6.8 %
NEUTROPHILS NFR BLD AUTO: 72.8 %
NITRITE UR QL STRIP.AUTO: NEGATIVE
NRBC BLD AUTO-RTO: 0 /100 WBC
PH UR STRIP.AUTO: 6 [PH] (ref 5–9)
PLATELET # BLD AUTO: 206 THOU/MM3 (ref 130–400)
PMV BLD AUTO: 12.1 FL (ref 9.4–12.4)
POTASSIUM SERPL-SCNC: 4.1 MEQ/L (ref 3.5–5.2)
PROLACTIN SERPL-MCNC: 13.3 NG/ML
PROT SERPL-MCNC: 7.1 G/DL (ref 6.1–8)
PROT UR STRIP.AUTO-MCNC: NEGATIVE MG/DL
RBC # BLD AUTO: 5.37 MILL/MM3 (ref 4.7–6.1)
RBC #/AREA URNS HPF: NORMAL /HPF
RENAL EPI CELLS #/AREA URNS HPF: NORMAL /[HPF]
SEGMENTED NEUTROPHILS ABSOLUTE COUNT: 3.2 THOU/MM3 (ref 1.8–7.7)
SODIUM SERPL-SCNC: 139 MEQ/L (ref 135–145)
SPECIFIC GRAVITY UA: 1.02 (ref 1–1.03)
UROBILINOGEN, URINE: 0.2 EU/DL (ref 0–1)
WBC # BLD AUTO: 4.4 THOU/MM3 (ref 4.8–10.8)
WBC #/AREA URNS HPF: NORMAL /HPF
YEAST LIKE FUNGI URNS QL MICRO: NORMAL

## 2023-03-25 LAB
FOLLICLE STIMULATING HORMONE: 3 MIU/ML (ref 1.5–12.4)
LUTEINIZING HORMONE: 4.4 MIU/ML (ref 1.7–8.6)

## 2023-03-27 LAB
SHBG SERPL-SCNC: NORMAL NMOL/L
TESTOSTERONE FREE: NORMAL

## 2023-04-24 ENCOUNTER — NURSE ONLY (OUTPATIENT)
Dept: LAB | Age: 43
End: 2023-04-24

## 2023-04-24 DIAGNOSIS — R53.82 CHRONIC FATIGUE: ICD-10-CM

## 2023-04-24 DIAGNOSIS — R79.89 LOW TESTOSTERONE IN MALE: ICD-10-CM

## 2023-04-25 LAB
ACTH PLAS-MCNC: 17.2 PG/ML (ref 7.2–63.3)
FERRITIN SERPL IA-MCNC: 38 NG/ML (ref 22–322)
IRON SATN MFR SERPL: 16 % (ref 20–50)
IRON SERPL-MCNC: 57 UG/DL (ref 65–195)
T4 FREE SERPL-MCNC: 1.11 NG/DL (ref 0.93–1.76)
TIBC SERPL-MCNC: 346 UG/DL (ref 171–450)
TRANSFERRIN SERPL-MCNC: 49 U/L (ref 8–52)
TSH SERPL DL<=0.005 MIU/L-ACNC: 2.25 UIU/ML (ref 0.4–4.2)

## 2023-04-26 LAB
CREAT 24H UR-MRATE: 2 GM/24HR
CREAT UR-MCNC: 68.9 MG/DL
HOURS COLLECTED: 24 HRS
URINE VOLUME, 24 HOUR: 2850 ML

## 2023-04-28 LAB — CORTISOL (UR), FREE: NORMAL

## 2023-06-19 RX ORDER — DULOXETIN HYDROCHLORIDE 30 MG/1
CAPSULE, DELAYED RELEASE ORAL
Qty: 270 CAPSULE | Refills: 3 | Status: SHIPPED | OUTPATIENT
Start: 2023-06-19

## 2023-08-29 RX ORDER — OMEPRAZOLE 40 MG/1
CAPSULE, DELAYED RELEASE ORAL
Qty: 90 CAPSULE | Refills: 3 | Status: SHIPPED | OUTPATIENT
Start: 2023-08-29

## 2023-09-19 RX ORDER — VALACYCLOVIR HYDROCHLORIDE 1 G/1
2000 TABLET, FILM COATED ORAL 2 TIMES DAILY
Qty: 20 TABLET | Refills: 1 | Status: SHIPPED | OUTPATIENT
Start: 2023-09-19

## 2023-12-12 RX ORDER — VALACYCLOVIR HYDROCHLORIDE 1 G/1
2000 TABLET, FILM COATED ORAL 2 TIMES DAILY
Qty: 20 TABLET | Refills: 1 | Status: SHIPPED | OUTPATIENT
Start: 2023-12-12

## 2023-12-27 ENCOUNTER — HOSPITAL ENCOUNTER (OUTPATIENT)
Age: 43
Discharge: HOME | End: 2023-12-29
Payer: COMMERCIAL

## 2023-12-27 VITALS
DIASTOLIC BLOOD PRESSURE: 76 MMHG | BODY MASS INDEX: 37.86 KG/M2 | HEIGHT: 74 IN | SYSTOLIC BLOOD PRESSURE: 139 MMHG | WEIGHT: 295 LBS

## 2023-12-27 DIAGNOSIS — J32.9 CHRONIC SINUSITIS, UNSPECIFIED LOCATION: ICD-10-CM

## 2023-12-27 DIAGNOSIS — Z79.899 HIGH RISK MEDICATION USE: ICD-10-CM

## 2023-12-27 DIAGNOSIS — M30.1 EOSINOPHILIC GRANULOMATOSIS WITH POLYANGIITIS (EGPA) (HCC): ICD-10-CM

## 2023-12-27 DIAGNOSIS — D72.18 EOSINOPHILIC GRANULOMATOSIS WITH POLYANGIITIS (EGPA) (HCC): ICD-10-CM

## 2023-12-27 LAB
ECHO AO ASC DIAM: 4 CM
ECHO AO ASCENDING AORTA INDEX: 1.56 CM/M2
ECHO AR MAX VEL PISA: 3.8 M/S
ECHO AV CUSP MM: 2.6 CM
ECHO AV PEAK GRADIENT: 9 MMHG
ECHO AV PEAK VELOCITY: 1.5 M/S
ECHO AV REGURGITANT PHT: 706 MS
ECHO AV VELOCITY RATIO: 0.8
ECHO BSA: 2.64 M2
ECHO LA AREA 2C: 16.9 CM2
ECHO LA AREA 4C: 18.6 CM2
ECHO LA DIAMETER INDEX: 1.21 CM/M2
ECHO LA DIAMETER: 3.1 CM
ECHO LA MAJOR AXIS: 5.9 CM
ECHO LA MINOR AXIS: 5.7 CM
ECHO LA VOL BP: 44 ML (ref 18–58)
ECHO LA VOL MOD A2C: 41 ML (ref 18–58)
ECHO LA VOL MOD A4C: 46 ML (ref 18–58)
ECHO LA VOL/BSA BIPLANE: 17 ML/M2 (ref 16–34)
ECHO LA VOLUME INDEX MOD A2C: 16 ML/M2 (ref 16–34)
ECHO LA VOLUME INDEX MOD A4C: 18 ML/M2 (ref 16–34)
ECHO LV E' LATERAL VELOCITY: 8 CM/S
ECHO LV E' SEPTAL VELOCITY: 8 CM/S
ECHO LV FRACTIONAL SHORTENING: 31 % (ref 28–44)
ECHO LV INTERNAL DIMENSION DIASTOLE INDEX: 1.91 CM/M2
ECHO LV INTERNAL DIMENSION DIASTOLIC: 4.9 CM (ref 4.2–5.9)
ECHO LV INTERNAL DIMENSION SYSTOLIC INDEX: 1.33 CM/M2
ECHO LV INTERNAL DIMENSION SYSTOLIC: 3.4 CM
ECHO LV ISOVOLUMETRIC RELAXATION TIME (IVRT): 92 MS
ECHO LV IVSD: 1.1 CM (ref 0.6–1)
ECHO LV MASS 2D: 200.5 G (ref 88–224)
ECHO LV MASS INDEX 2D: 78.3 G/M2 (ref 49–115)
ECHO LV POSTERIOR WALL DIASTOLIC: 1.1 CM (ref 0.6–1)
ECHO LV RELATIVE WALL THICKNESS RATIO: 0.45
ECHO LVOT PEAK GRADIENT: 6 MMHG
ECHO LVOT PEAK VELOCITY: 1.2 M/S
ECHO MV A VELOCITY: 0.75 M/S
ECHO MV E DECELERATION TIME (DT): 214 MS
ECHO MV E VELOCITY: 0.92 M/S
ECHO MV E/A RATIO: 1.23
ECHO MV E/E' LATERAL: 11.5
ECHO MV E/E' RATIO (AVERAGED): 11.5
ECHO PV MAX VELOCITY: 0.8 M/S
ECHO PV PEAK GRADIENT: 2 MMHG
ECHO RV INTERNAL DIMENSION: 3.5 CM
ECHO RV TAPSE: 2.1 CM (ref 1.7–?)
ECHO TV E WAVE: 0.6 M/S
ECHO TV REGURGITANT MAX VELOCITY: 2.39 M/S
ECHO TV REGURGITANT PEAK GRADIENT: 23 MMHG

## 2023-12-27 PROCEDURE — 93306 TTE W/DOPPLER COMPLETE: CPT | Performed by: NUCLEAR MEDICINE

## 2023-12-27 PROCEDURE — 93306 TTE W/DOPPLER COMPLETE: CPT

## 2024-03-01 RX ORDER — BUSPIRONE HYDROCHLORIDE 30 MG/1
TABLET ORAL
Qty: 90 TABLET | Refills: 3 | OUTPATIENT
Start: 2024-03-01

## 2024-03-05 ASSESSMENT — PATIENT HEALTH QUESTIONNAIRE - PHQ9
7. TROUBLE CONCENTRATING ON THINGS, SUCH AS READING THE NEWSPAPER OR WATCHING TELEVISION: MORE THAN HALF THE DAYS
5. POOR APPETITE OR OVEREATING: 3
6. FEELING BAD ABOUT YOURSELF - OR THAT YOU ARE A FAILURE OR HAVE LET YOURSELF OR YOUR FAMILY DOWN: 3
2. FEELING DOWN, DEPRESSED OR HOPELESS: SEVERAL DAYS
3. TROUBLE FALLING OR STAYING ASLEEP: NEARLY EVERY DAY
8. MOVING OR SPEAKING SO SLOWLY THAT OTHER PEOPLE COULD HAVE NOTICED. OR THE OPPOSITE, BEING SO FIGETY OR RESTLESS THAT YOU HAVE BEEN MOVING AROUND A LOT MORE THAN USUAL: 2
8. MOVING OR SPEAKING SO SLOWLY THAT OTHER PEOPLE COULD HAVE NOTICED. OR THE OPPOSITE - BEING SO FIDGETY OR RESTLESS THAT YOU HAVE BEEN MOVING AROUND A LOT MORE THAN USUAL: MORE THAN HALF THE DAYS
10. IF YOU CHECKED OFF ANY PROBLEMS, HOW DIFFICULT HAVE THESE PROBLEMS MADE IT FOR YOU TO DO YOUR WORK, TAKE CARE OF THINGS AT HOME, OR GET ALONG WITH OTHER PEOPLE: 2
5. POOR APPETITE OR OVEREATING: NEARLY EVERY DAY
SUM OF ALL RESPONSES TO PHQ QUESTIONS 1-9: 19
4. FEELING TIRED OR HAVING LITTLE ENERGY: NEARLY EVERY DAY
2. FEELING DOWN, DEPRESSED OR HOPELESS: 1
SUM OF ALL RESPONSES TO PHQ9 QUESTIONS 1 & 2: 3
SUM OF ALL RESPONSES TO PHQ QUESTIONS 1-9: 19
3. TROUBLE FALLING OR STAYING ASLEEP: 3
10. IF YOU CHECKED OFF ANY PROBLEMS, HOW DIFFICULT HAVE THESE PROBLEMS MADE IT FOR YOU TO DO YOUR WORK, TAKE CARE OF THINGS AT HOME, OR GET ALONG WITH OTHER PEOPLE: VERY DIFFICULT
1. LITTLE INTEREST OR PLEASURE IN DOING THINGS: MORE THAN HALF THE DAYS
9. THOUGHTS THAT YOU WOULD BE BETTER OFF DEAD, OR OF HURTING YOURSELF: NOT AT ALL
SUM OF ALL RESPONSES TO PHQ QUESTIONS 1-9: 19
SUM OF ALL RESPONSES TO PHQ9 QUESTIONS 1 & 2: 3
6. FEELING BAD ABOUT YOURSELF - OR THAT YOU ARE A FAILURE OR HAVE LET YOURSELF OR YOUR FAMILY DOWN: NEARLY EVERY DAY
7. TROUBLE CONCENTRATING ON THINGS, SUCH AS READING THE NEWSPAPER OR WATCHING TELEVISION: 2
SUM OF ALL RESPONSES TO PHQ QUESTIONS 1-9: 19
1. LITTLE INTEREST OR PLEASURE IN DOING THINGS: 2
9. THOUGHTS THAT YOU WOULD BE BETTER OFF DEAD, OR OF HURTING YOURSELF: 0
4. FEELING TIRED OR HAVING LITTLE ENERGY: 3
SUM OF ALL RESPONSES TO PHQ QUESTIONS 1-9: 19

## 2024-03-06 ENCOUNTER — OFFICE VISIT (OUTPATIENT)
Dept: FAMILY MEDICINE CLINIC | Age: 44
End: 2024-03-06
Payer: COMMERCIAL

## 2024-03-06 VITALS
TEMPERATURE: 97.9 F | OXYGEN SATURATION: 96 % | DIASTOLIC BLOOD PRESSURE: 82 MMHG | BODY MASS INDEX: 39.94 KG/M2 | RESPIRATION RATE: 16 BRPM | SYSTOLIC BLOOD PRESSURE: 132 MMHG | WEIGHT: 311.2 LBS | HEART RATE: 58 BPM | HEIGHT: 74 IN

## 2024-03-06 DIAGNOSIS — F41.9 ANXIETY: ICD-10-CM

## 2024-03-06 DIAGNOSIS — Z12.11 SCREEN FOR COLON CANCER: ICD-10-CM

## 2024-03-06 DIAGNOSIS — Z12.5 SCREENING FOR PROSTATE CANCER: ICD-10-CM

## 2024-03-06 DIAGNOSIS — M30.1 PULMONARY DISEASE DUE TO EOSINOPHILIC GRANULOMATOSIS WITH POLYANGIITIS (EGPA) (HCC): ICD-10-CM

## 2024-03-06 DIAGNOSIS — Z00.00 ROUTINE PHYSICAL EXAMINATION: Primary | ICD-10-CM

## 2024-03-06 DIAGNOSIS — Z23 NEED FOR TDAP VACCINATION: ICD-10-CM

## 2024-03-06 PROCEDURE — 90471 IMMUNIZATION ADMIN: CPT | Performed by: NURSE PRACTITIONER

## 2024-03-06 PROCEDURE — 90715 TDAP VACCINE 7 YRS/> IM: CPT | Performed by: NURSE PRACTITIONER

## 2024-03-06 PROCEDURE — 99396 PREV VISIT EST AGE 40-64: CPT | Performed by: NURSE PRACTITIONER

## 2024-03-06 RX ORDER — BUSPIRONE HYDROCHLORIDE 30 MG/1
TABLET ORAL
COMMUNITY
Start: 2023-12-12 | End: 2024-03-06 | Stop reason: SDUPTHER

## 2024-03-06 RX ORDER — PREDNISONE 5 MG/1
5 TABLET ORAL DAILY
COMMUNITY
Start: 2023-12-07

## 2024-03-06 RX ORDER — BUSPIRONE HYDROCHLORIDE 15 MG/1
15 TABLET ORAL 2 TIMES DAILY
Qty: 180 TABLET | Refills: 3 | Status: SHIPPED | OUTPATIENT
Start: 2024-03-06

## 2024-03-06 SDOH — ECONOMIC STABILITY: FOOD INSECURITY: WITHIN THE PAST 12 MONTHS, THE FOOD YOU BOUGHT JUST DIDN'T LAST AND YOU DIDN'T HAVE MONEY TO GET MORE.: NEVER TRUE

## 2024-03-06 SDOH — ECONOMIC STABILITY: INCOME INSECURITY: HOW HARD IS IT FOR YOU TO PAY FOR THE VERY BASICS LIKE FOOD, HOUSING, MEDICAL CARE, AND HEATING?: NOT HARD AT ALL

## 2024-03-06 SDOH — ECONOMIC STABILITY: HOUSING INSECURITY
IN THE LAST 12 MONTHS, WAS THERE A TIME WHEN YOU DID NOT HAVE A STEADY PLACE TO SLEEP OR SLEPT IN A SHELTER (INCLUDING NOW)?: NO

## 2024-03-06 SDOH — ECONOMIC STABILITY: FOOD INSECURITY: WITHIN THE PAST 12 MONTHS, YOU WORRIED THAT YOUR FOOD WOULD RUN OUT BEFORE YOU GOT MONEY TO BUY MORE.: NEVER TRUE

## 2024-03-06 NOTE — PROGRESS NOTES
Chief Complaint:   Anthony Mcdaniel is a 44 y.o. male who presents for complete physical examination    History of Present Illness:    Chief Complaint   Patient presents with    Annual Exam     Physical      Health Maintenance   Topic Date Due    Hepatitis B vaccine (1 of 3 - 3-dose series) Never done    COVID-19 Vaccine (1) Never done    Shingles vaccine (1 of 2) Never done    Diabetes screen  Never done    Flu vaccine (1) 08/01/2023    Colorectal Cancer Screen  Never done    Pneumococcal 0-64 years Vaccine (3 - PPSV23 or PCV20) 09/27/2024    Depression Monitoring  03/05/2025    Lipids  12/14/2027    DTaP/Tdap/Td vaccine (3 - Td or Tdap) 03/06/2034    Hepatitis A vaccine  Aged Out    Hib vaccine  Aged Out    HPV vaccine  Aged Out    Polio vaccine  Aged Out    Meningococcal (ACWY) vaccine  Aged Out    Depression Screen  Discontinued    Hepatitis C screen  Discontinued    HIV screen  Discontinued           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)    Abdominal wall mass     Past Medical History:   Diagnosis Date    Allergic rhinitis     Anxiety     Chronic rhinosinusitis     Depression     Hypercholesteremia     Sleep apnea     Vocal cord dysfunction        Past Surgical History:   Procedure Laterality Date    ABDOMEN SURGERY N/A 12/20/2022    EXCISION ABDOMINAL WALL SOFT TISS:UE LIPOMA performed by Skylar Griffin MD at [facility] OR    SINUS SURGERY  12/01/2016    SINUS SURGERY  2021    [place]       Current Outpatient Medications   Medication Sig Dispense Refill    predniSONE (DELTASONE) 5 MG tablet Take 1 tablet by mouth daily      busPIRone (BUSPAR) 15 MG tablet Take 15 mg by mouth in the morning and at bedtime 180 tablet 3    valACYclovir (VALTREX) 1 g tablet take 2 tablets by mouth twice a day 20 tablet 1    omeprazole (PRILOSEC) 40 MG delayed release capsule TAKE 1

## 2024-03-07 ENCOUNTER — NURSE ONLY (OUTPATIENT)
Dept: LAB | Age: 44
End: 2024-03-07

## 2024-03-07 DIAGNOSIS — Z12.5 SCREENING FOR PROSTATE CANCER: ICD-10-CM

## 2024-03-07 DIAGNOSIS — Z00.00 ROUTINE PHYSICAL EXAMINATION: ICD-10-CM

## 2024-03-07 LAB
ALBUMIN SERPL BCG-MCNC: 4.3 G/DL (ref 3.5–5.1)
ALP SERPL-CCNC: 69 U/L (ref 38–126)
ALT SERPL W/O P-5'-P-CCNC: 36 U/L (ref 11–66)
ANION GAP SERPL CALC-SCNC: 13 MEQ/L (ref 8–16)
AST SERPL-CCNC: 35 U/L (ref 5–40)
BACTERIA: ABNORMAL
BASOPHILS ABSOLUTE: 0 THOU/MM3 (ref 0–0.1)
BASOPHILS NFR BLD AUTO: 0.8 %
BILIRUB SERPL-MCNC: 0.3 MG/DL (ref 0.3–1.2)
BILIRUB UR QL STRIP: NEGATIVE
BUN SERPL-MCNC: 12 MG/DL (ref 7–22)
CALCIUM SERPL-MCNC: 9.2 MG/DL (ref 8.5–10.5)
CASTS #/AREA URNS LPF: ABNORMAL /LPF
CASTS #/AREA URNS LPF: ABNORMAL /LPF
CHARACTER UR: CLEAR
CHARCOAL URNS QL MICRO: ABNORMAL
CHLORIDE SERPL-SCNC: 103 MEQ/L (ref 98–111)
CHOLEST SERPL-MCNC: 211 MG/DL (ref 100–199)
CO2 SERPL-SCNC: 25 MEQ/L (ref 23–33)
COLOR UR: YELLOW
CREAT SERPL-MCNC: 1.2 MG/DL (ref 0.4–1.2)
CRP SERPL-MCNC: < 0.3 MG/DL (ref 0–1)
CRYSTALS URNS QL MICRO: ABNORMAL
DEPRECATED RDW RBC AUTO: 43.8 FL (ref 35–45)
EOSINOPHIL NFR BLD AUTO: 1.4 %
EOSINOPHILS ABSOLUTE: 0.1 THOU/MM3 (ref 0–0.4)
EPITHELIAL CELLS, UA: ABNORMAL /HPF
ERYTHROCYTE [DISTWIDTH] IN BLOOD BY AUTOMATED COUNT: 14.1 % (ref 11.5–14.5)
ERYTHROCYTE [SEDIMENTATION RATE] IN BLOOD BY WESTERGREN METHOD: 16 MM/HR (ref 0–10)
GFR SERPL CREATININE-BSD FRML MDRD: > 60 ML/MIN/1.73M2
GLUCOSE SERPL-MCNC: 95 MG/DL (ref 70–108)
GLUCOSE UR QL STRIP.AUTO: NEGATIVE MG/DL
HCT VFR BLD AUTO: 46.4 % (ref 42–52)
HDLC SERPL-MCNC: 33 MG/DL
HGB BLD-MCNC: 15 GM/DL (ref 14–18)
HGB UR QL STRIP.AUTO: NEGATIVE
IMM GRANULOCYTES # BLD AUTO: 0.03 THOU/MM3 (ref 0–0.07)
IMM GRANULOCYTES NFR BLD AUTO: 0.6 %
KETONES UR QL STRIP.AUTO: ABNORMAL
LDLC SERPL CALC-MCNC: ABNORMAL MG/DL
LEUKOCYTE ESTERASE UR QL STRIP.AUTO: NEGATIVE
LYMPHOCYTES ABSOLUTE: 0.8 THOU/MM3 (ref 1–4.8)
LYMPHOCYTES NFR BLD AUTO: 15.7 %
MCH RBC QN AUTO: 28.4 PG (ref 26–33)
MCHC RBC AUTO-ENTMCNC: 32.3 GM/DL (ref 32.2–35.5)
MCV RBC AUTO: 87.7 FL (ref 80–94)
MONOCYTES ABSOLUTE: 0.4 THOU/MM3 (ref 0.4–1.3)
MONOCYTES NFR BLD AUTO: 7.8 %
NEUTROPHILS NFR BLD AUTO: 73.7 %
NITRITE UR QL STRIP.AUTO: NEGATIVE
NRBC BLD AUTO-RTO: 0 /100 WBC
PH UR STRIP.AUTO: 5.5 [PH] (ref 5–9)
PLATELET # BLD AUTO: 226 THOU/MM3 (ref 130–400)
PMV BLD AUTO: 12.3 FL (ref 9.4–12.4)
POTASSIUM SERPL-SCNC: 4.1 MEQ/L (ref 3.5–5.2)
PROT SERPL-MCNC: 7.2 G/DL (ref 6.1–8)
PROT UR STRIP.AUTO-MCNC: NEGATIVE MG/DL
PSA SERPL-MCNC: 0.67 NG/ML (ref 0–1)
RBC # BLD AUTO: 5.29 MILL/MM3 (ref 4.7–6.1)
RBC #/AREA URNS HPF: ABNORMAL /HPF
RENAL EPI CELLS #/AREA URNS HPF: ABNORMAL /[HPF]
SEGMENTED NEUTROPHILS ABSOLUTE COUNT: 3.8 THOU/MM3 (ref 1.8–7.7)
SODIUM SERPL-SCNC: 141 MEQ/L (ref 135–145)
SPECIFIC GRAVITY UA: 1.02 (ref 1–1.03)
TRIGL SERPL-MCNC: 411 MG/DL (ref 0–199)
UROBILINOGEN, URINE: 0.2 EU/DL (ref 0–1)
WBC # BLD AUTO: 5.2 THOU/MM3 (ref 4.8–10.8)
WBC #/AREA URNS HPF: ABNORMAL /HPF
YEAST LIKE FUNGI URNS QL MICRO: ABNORMAL

## 2024-03-11 ENCOUNTER — TELEPHONE (OUTPATIENT)
Dept: FAMILY MEDICINE CLINIC | Age: 44
End: 2024-03-11

## 2024-03-11 DIAGNOSIS — E78.2 MIXED HYPERLIPIDEMIA: Primary | ICD-10-CM

## 2024-03-11 LAB — TESTOSTERONE, FREE W SHGB, FEMALES/CHILDREN: NORMAL

## 2024-03-11 RX ORDER — ATORVASTATIN CALCIUM 20 MG/1
20 TABLET, FILM COATED ORAL DAILY
Qty: 30 TABLET | Refills: 11 | Status: SHIPPED | OUTPATIENT
Start: 2024-03-11 | End: 2025-03-11

## 2024-03-11 NOTE — TELEPHONE ENCOUNTER
Please call.  Labs showed higher cholesterol than we would like.  Recommend to start lipitor daily and recheck lipids in 3months

## 2024-03-17 ENCOUNTER — CLINICAL DOCUMENTATION (OUTPATIENT)
Age: 44
End: 2024-03-17

## 2024-03-19 ENCOUNTER — OFFICE VISIT (OUTPATIENT)
Dept: FAMILY MEDICINE CLINIC | Age: 44
End: 2024-03-19
Payer: COMMERCIAL

## 2024-03-19 VITALS
OXYGEN SATURATION: 95 % | BODY MASS INDEX: 39.78 KG/M2 | SYSTOLIC BLOOD PRESSURE: 132 MMHG | HEIGHT: 74 IN | WEIGHT: 310 LBS | TEMPERATURE: 97.3 F | DIASTOLIC BLOOD PRESSURE: 76 MMHG | RESPIRATION RATE: 16 BRPM | HEART RATE: 64 BPM

## 2024-03-19 DIAGNOSIS — M30.1 PULMONARY DISEASE DUE TO EOSINOPHILIC GRANULOMATOSIS WITH POLYANGIITIS (EGPA) (HCC): Primary | ICD-10-CM

## 2024-03-19 PROCEDURE — 96372 THER/PROPH/DIAG INJ SC/IM: CPT | Performed by: NURSE PRACTITIONER

## 2024-03-19 PROCEDURE — 99213 OFFICE O/P EST LOW 20 MIN: CPT | Performed by: NURSE PRACTITIONER

## 2024-03-19 RX ORDER — METHYLPREDNISOLONE 4 MG/1
TABLET ORAL
Qty: 1 KIT | Refills: 0 | Status: SHIPPED | OUTPATIENT
Start: 2024-03-19 | End: 2024-03-25

## 2024-03-19 RX ORDER — TRIAMCINOLONE ACETONIDE 40 MG/ML
40 INJECTION, SUSPENSION INTRA-ARTICULAR; INTRAMUSCULAR ONCE
Status: COMPLETED | OUTPATIENT
Start: 2024-03-19 | End: 2024-03-19

## 2024-03-19 RX ORDER — AZITHROMYCIN 250 MG/1
TABLET, FILM COATED ORAL
Qty: 1 PACKET | Refills: 0 | Status: SHIPPED | OUTPATIENT
Start: 2024-03-19

## 2024-03-19 RX ADMIN — TRIAMCINOLONE ACETONIDE 40 MG: 40 INJECTION, SUSPENSION INTRA-ARTICULAR; INTRAMUSCULAR at 12:00

## 2024-03-19 ASSESSMENT — ENCOUNTER SYMPTOMS
GASTROINTESTINAL NEGATIVE: 1
WHEEZING: 1
COUGH: 1
EYES NEGATIVE: 1

## 2024-03-19 NOTE — PROGRESS NOTES
Administrations This Visit       triamcinolone acetonide (KENALOG-40) injection 40 mg       Admin Date  03/19/2024  12:00 Action  Given Dose  40 mg Route  IntraMUSCular Site  Dorsogluteal Right Administered By  Pam Low MA    Ordering Provider: Chema Osorio APRN - CNP    NDC: 6586-3087-03    Lot#: [id number]    : [employer] [place] (PRIMARY CARE)    Patient Supplied?: No                  Patient tolerated well  
   triamcinolone acetonide (KENALOG-40) injection 40 mg    methylPREDNISolone (MEDROL DOSEPACK) 4 MG tablet     Sig: Take by mouth.     Dispense:  1 kit     Refill:  0    azithromycin (ZITHROMAX) 250 MG tablet     Sig: Take 2 tabs (500 mg) on Day 1, and take 1 tab (250 mg) on days 2 through 5.     Dispense:  1 packet     Refill:  0      See orders  Advised to call back directly if there are further questions, or if these symptoms fail to improve as anticipated or worsen.       Patient given educational materials - seepatient instructions.  Discussed use, benefit, and side effects of prescribed medications.All patient questions answered.  Pt voiced understanding.  Patient agreed withtreatment plan. Follow up as directed.     Electronically signed by JOHN Medrano CNP on 3/19/2024 at 12:47 PM

## 2024-03-26 ENCOUNTER — PATIENT MESSAGE (OUTPATIENT)
Dept: FAMILY MEDICINE CLINIC | Age: 44
End: 2024-03-26

## 2024-03-26 DIAGNOSIS — M30.1 PULMONARY DISEASE DUE TO EOSINOPHILIC GRANULOMATOSIS WITH POLYANGIITIS (EGPA) (HCC): Primary | ICD-10-CM

## 2024-03-26 RX ORDER — LEVOFLOXACIN 500 MG/1
500 TABLET, FILM COATED ORAL DAILY
Qty: 10 TABLET | Refills: 0 | Status: SHIPPED | OUTPATIENT
Start: 2024-03-26 | End: 2024-04-05

## 2024-03-26 NOTE — TELEPHONE ENCOUNTER
From: Anthony Mcdaniel  To: Chema Osorio  Sent: 3/26/2024 3:53 PM EDT  Subject: Cough not improving    My cough has not improved since starting the medications last Thursday. Every day it gets progressively worse as the day goes on. I still feel a lot of fluid in my chest, and the intensity of the coughing has led to multiple pulled muscles at this point. I'm up for any suggestions.

## 2024-05-20 RX ORDER — DULOXETIN HYDROCHLORIDE 30 MG/1
CAPSULE, DELAYED RELEASE ORAL
Qty: 270 CAPSULE | Refills: 3 | Status: SHIPPED | OUTPATIENT
Start: 2024-05-20

## 2024-05-23 DIAGNOSIS — E78.2 MIXED HYPERLIPIDEMIA: ICD-10-CM

## 2024-05-23 RX ORDER — ATORVASTATIN CALCIUM 20 MG/1
20 TABLET, FILM COATED ORAL DAILY
Qty: 30 TABLET | Refills: 11 | Status: SHIPPED | OUTPATIENT
Start: 2024-05-23 | End: 2025-05-23

## 2024-05-29 RX ORDER — VALACYCLOVIR HYDROCHLORIDE 1 G/1
2000 TABLET, FILM COATED ORAL 2 TIMES DAILY
Qty: 20 TABLET | Refills: 1 | Status: SHIPPED | OUTPATIENT
Start: 2024-05-29

## 2024-05-29 NOTE — TELEPHONE ENCOUNTER
Last visit- 3/19/2024  Next visit- Visit date not found    Requested Prescriptions     Pending Prescriptions Disp Refills    valACYclovir (VALTREX) 1 g tablet 20 tablet 1     Sig: Take 2 tablets by mouth 2 times daily

## 2024-06-11 DIAGNOSIS — E78.2 MIXED HYPERLIPIDEMIA: ICD-10-CM

## 2024-06-11 RX ORDER — ATORVASTATIN CALCIUM 20 MG/1
20 TABLET, FILM COATED ORAL DAILY
Qty: 90 TABLET | Refills: 0 | Status: SHIPPED | OUTPATIENT
Start: 2024-06-11 | End: 2025-06-11

## 2024-06-11 NOTE — TELEPHONE ENCOUNTER
Last visit- 3/19/2024  Next visit- Visit date not found    Requested Prescriptions     Pending Prescriptions Disp Refills    atorvastatin (LIPITOR) 20 MG tablet 90 tablet 3     Sig: Take 1 tablet by mouth daily

## 2024-06-13 ENCOUNTER — TELEMEDICINE (OUTPATIENT)
Age: 44
End: 2024-06-13
Payer: COMMERCIAL

## 2024-06-13 DIAGNOSIS — R79.89 LOW TESTOSTERONE IN MALE: Primary | ICD-10-CM

## 2024-06-13 DIAGNOSIS — E78.2 MIXED HYPERLIPIDEMIA: ICD-10-CM

## 2024-06-13 DIAGNOSIS — R53.82 CHRONIC FATIGUE: ICD-10-CM

## 2024-06-13 PROCEDURE — 99215 OFFICE O/P EST HI 40 MIN: CPT | Performed by: INTERNAL MEDICINE

## 2024-06-13 NOTE — PROGRESS NOTES
9/13/2024).       Subjective   HPI  This patient was sent to me for evaluation of low testosterone which was detected on 12/14/2022. On that day he had labs which showed testosterone level of 92 ng/dL which is low. The patient reports poor libido. He also has experienced depression, problems with memory and concentration as well as fatigue.  Basis symptoms have not changed.  The patient is unable to lose weight.  He tells me that he is eating healthy but has been unable to lose weight.  He had his testosterone level rechecked in March of this year and again it was low.  He is experiencing an overwhelming sense of fatigue and is unable to do much at the moment.  This patient was last seen in clinic about a year ago.  Review of Systems  Respiratory: mild cough  GI: NORMAL  : no dysuria or hematuria.  CVS: no chest pain or palpitations       Objective   Patient-Reported Vitals  No data recorded     Physical Exam    Constitutional: [x] Appears well-developed and well-nourished [x] No apparent distress      Mental status: [x] Alert and awake  [x] Oriented to person/place/time [x] Able to follow commands      Eyes:   EOM    [x]  Normal         HENT: [x] Normocephalic, atraumatic    [x] Mouth/Throat: Mucous membranes are moist    External Ears [x] Normal     Neck: [x] No visualized mass    Pulmonary/Chest: [x] Respiratory effort normal   [x] No visualized signs of difficulty breathing or respiratory distress     Musculoskeletal:          [x] Normal range of motion of neck    Neurological:        [x] No Facial Asymmetry (Cranial nerve 7 motor function) (limited exam due to video visit)           Skin:        [x] No significant exanthematous lesions or discoloration noted on facial skin             Psychiatric:       [x] Normal Affect        [x] No Hallucinations    On this date 6/13/2024 I have spent 42 minutes reviewing previous notes, test results and face to face (virtual) with the patient discussing the diagnosis and

## 2024-06-19 ENCOUNTER — PATIENT MESSAGE (OUTPATIENT)
Dept: FAMILY MEDICINE CLINIC | Age: 44
End: 2024-06-19

## 2024-06-19 DIAGNOSIS — E78.2 MIXED HYPERLIPIDEMIA: ICD-10-CM

## 2024-06-19 RX ORDER — ATORVASTATIN CALCIUM 20 MG/1
20 TABLET, FILM COATED ORAL DAILY
Qty: 90 TABLET | Refills: 3 | Status: SHIPPED | OUTPATIENT
Start: 2024-06-19 | End: 2025-06-19

## 2024-06-19 NOTE — TELEPHONE ENCOUNTER
From: Anthony Mcdaniel  To: Chema Osorio  Sent: 6/19/2024 8:40 AM EDT  Subject: Lipitor    Hi, I'm still trying to have my Lipitor prescription changed from a 30 day supply to a 90 day supply. [facility] is insisting that it be sent in as a 90 day supply. If this isn't possible for whatever reason can you please send a refill to [facility] asap? I've been out for over a week. Below is the message I'm receiving from [facility]:    Action Required  We haven’t received a prescription and may have to cancel this order    Your Next Step  To avoid a cancellation, contact your doctor’s office directly and ask them to send your prescription electronically to [facility] home delivery. If you have less than a 7-day supply of medication, contact us.    Our Next Step  We won't be able to complete your order until your doctor gets back to us. We'll continue to reach out, but if they don't respond soon, this order will be canceled.

## 2024-06-25 RX ORDER — VALACYCLOVIR HYDROCHLORIDE 1 G/1
2000 TABLET, FILM COATED ORAL 2 TIMES DAILY
Qty: 20 TABLET | Refills: 1 | Status: SHIPPED | OUTPATIENT
Start: 2024-06-25

## 2024-07-09 RX ORDER — VALACYCLOVIR HYDROCHLORIDE 1 G/1
2000 TABLET, FILM COATED ORAL 2 TIMES DAILY
Qty: 20 TABLET | Refills: 1 | Status: SHIPPED | OUTPATIENT
Start: 2024-07-09

## 2024-08-08 RX ORDER — OMEPRAZOLE 40 MG/1
CAPSULE, DELAYED RELEASE ORAL
Qty: 90 CAPSULE | Refills: 3 | Status: SHIPPED | OUTPATIENT
Start: 2024-08-08

## 2024-08-08 NOTE — TELEPHONE ENCOUNTER
Last visit- 3/19/2024  Next visit- Visit date not found    Requested Prescriptions     Pending Prescriptions Disp Refills    omeprazole (PRILOSEC) 40 MG delayed release capsule [Pharmacy Med Name: OMEPRAZOLE DR CAPS 40MG] 90 capsule 3     Sig: TAKE 1 CAPSULE DAILY

## 2024-08-19 ENCOUNTER — PATIENT MESSAGE (OUTPATIENT)
Dept: FAMILY MEDICINE CLINIC | Age: 44
End: 2024-08-19

## 2024-08-19 DIAGNOSIS — M30.1 PULMONARY DISEASE DUE TO EOSINOPHILIC GRANULOMATOSIS WITH POLYANGIITIS (EGPA) (HCC): ICD-10-CM

## 2024-08-19 RX ORDER — LEVOFLOXACIN 500 MG/1
500 TABLET, FILM COATED ORAL DAILY
Qty: 10 TABLET | Refills: 0 | Status: SHIPPED | OUTPATIENT
Start: 2024-08-19 | End: 2024-08-29

## 2024-08-19 RX ORDER — METHYLPREDNISOLONE 4 MG/1
TABLET ORAL
Qty: 1 KIT | Refills: 0 | Status: SHIPPED | OUTPATIENT
Start: 2024-08-19 | End: 2024-08-25

## 2024-09-09 ENCOUNTER — PATIENT MESSAGE (OUTPATIENT)
Dept: FAMILY MEDICINE CLINIC | Age: 44
End: 2024-09-09

## 2024-09-10 ENCOUNTER — LAB (OUTPATIENT)
Dept: LAB | Age: 44
End: 2024-09-10

## 2024-09-10 DIAGNOSIS — E78.2 MIXED HYPERLIPIDEMIA: ICD-10-CM

## 2024-09-10 LAB
ALBUMIN SERPL BCG-MCNC: 4.5 G/DL (ref 3.5–5.1)
ALP SERPL-CCNC: 63 U/L (ref 38–126)
ALT SERPL W/O P-5'-P-CCNC: 30 U/L (ref 11–66)
ANION GAP SERPL CALC-SCNC: 13 MEQ/L (ref 8–16)
AST SERPL-CCNC: 35 U/L (ref 5–40)
BACTERIA: ABNORMAL
BASOPHILS ABSOLUTE: 0 THOU/MM3 (ref 0–0.1)
BASOPHILS NFR BLD AUTO: 0.6 %
BILIRUB SERPL-MCNC: 0.6 MG/DL (ref 0.3–1.2)
BILIRUB UR QL STRIP: NEGATIVE
BUN SERPL-MCNC: 16 MG/DL (ref 7–22)
CALCIUM SERPL-MCNC: 9.4 MG/DL (ref 8.5–10.5)
CASTS #/AREA URNS LPF: ABNORMAL /LPF
CASTS #/AREA URNS LPF: ABNORMAL /LPF
CHARACTER UR: CLEAR
CHARCOAL URNS QL MICRO: ABNORMAL
CHLORIDE SERPL-SCNC: 104 MEQ/L (ref 98–111)
CHOLEST SERPL-MCNC: 205 MG/DL (ref 100–199)
CO2 SERPL-SCNC: 24 MEQ/L (ref 23–33)
COLOR UR: ABNORMAL
CREAT SERPL-MCNC: 1.2 MG/DL (ref 0.4–1.2)
CRP SERPL-MCNC: < 0.3 MG/DL (ref 0–1)
CRYSTALS URNS QL MICRO: ABNORMAL
DEPRECATED RDW RBC AUTO: 45.1 FL (ref 35–45)
EOSINOPHIL NFR BLD AUTO: 0.8 %
EOSINOPHILS ABSOLUTE: 0 THOU/MM3 (ref 0–0.4)
EPITHELIAL CELLS, UA: ABNORMAL /HPF
ERYTHROCYTE [DISTWIDTH] IN BLOOD BY AUTOMATED COUNT: 14.2 % (ref 11.5–14.5)
ERYTHROCYTE [SEDIMENTATION RATE] IN BLOOD BY WESTERGREN METHOD: 6 MM/HR (ref 0–10)
GFR SERPL CREATININE-BSD FRML MDRD: 76 ML/MIN/1.73M2
GLUCOSE SERPL-MCNC: 108 MG/DL (ref 70–108)
GLUCOSE UR QL STRIP.AUTO: NEGATIVE MG/DL
HCT VFR BLD AUTO: 45.2 % (ref 42–52)
HDLC SERPL-MCNC: 51 MG/DL
HGB BLD-MCNC: 14.7 GM/DL (ref 14–18)
HGB UR QL STRIP.AUTO: NEGATIVE
IMM GRANULOCYTES # BLD AUTO: 0.01 THOU/MM3 (ref 0–0.07)
IMM GRANULOCYTES NFR BLD AUTO: 0.2 %
KETONES UR QL STRIP.AUTO: ABNORMAL
LDLC SERPL CALC-MCNC: 110 MG/DL
LEUKOCYTE ESTERASE UR QL STRIP.AUTO: NEGATIVE
LYMPHOCYTES ABSOLUTE: 0.8 THOU/MM3 (ref 1–4.8)
LYMPHOCYTES NFR BLD AUTO: 15.1 %
MCH RBC QN AUTO: 28.5 PG (ref 26–33)
MCHC RBC AUTO-ENTMCNC: 32.5 GM/DL (ref 32.2–35.5)
MCV RBC AUTO: 87.6 FL (ref 80–94)
MONOCYTES ABSOLUTE: 0.4 THOU/MM3 (ref 0.4–1.3)
MONOCYTES NFR BLD AUTO: 6.9 %
NEUTROPHILS ABSOLUTE: 4 THOU/MM3 (ref 1.8–7.7)
NEUTROPHILS NFR BLD AUTO: 76.4 %
NITRITE UR QL STRIP.AUTO: NEGATIVE
NRBC BLD AUTO-RTO: 0 /100 WBC
PH UR STRIP.AUTO: 6 [PH] (ref 5–9)
PLATELET # BLD AUTO: 202 THOU/MM3 (ref 130–400)
PMV BLD AUTO: 12.5 FL (ref 9.4–12.4)
POTASSIUM SERPL-SCNC: 4 MEQ/L (ref 3.5–5.2)
PROT SERPL-MCNC: 7.1 G/DL (ref 6.1–8)
PROT UR STRIP.AUTO-MCNC: NEGATIVE MG/DL
RBC # BLD AUTO: 5.16 MILL/MM3 (ref 4.7–6.1)
RBC #/AREA URNS HPF: ABNORMAL /HPF
RENAL EPI CELLS #/AREA URNS HPF: ABNORMAL /[HPF]
SODIUM SERPL-SCNC: 141 MEQ/L (ref 135–145)
SPECIFIC GRAVITY UA: 1.02 (ref 1–1.03)
TRIGL SERPL-MCNC: 222 MG/DL (ref 0–199)
UROBILINOGEN, URINE: 1 EU/DL (ref 0–1)
WBC # BLD AUTO: 5.2 THOU/MM3 (ref 4.8–10.8)
WBC #/AREA URNS HPF: ABNORMAL /HPF
YEAST LIKE FUNGI URNS QL MICRO: ABNORMAL

## 2024-10-09 RX ORDER — VALACYCLOVIR HYDROCHLORIDE 1 G/1
2000 TABLET, FILM COATED ORAL 2 TIMES DAILY
Qty: 20 TABLET | Refills: 1 | Status: SHIPPED | OUTPATIENT
Start: 2024-10-09

## 2024-12-03 ENCOUNTER — OFFICE VISIT (OUTPATIENT)
Dept: FAMILY MEDICINE CLINIC | Age: 44
End: 2024-12-03
Payer: COMMERCIAL

## 2024-12-03 VITALS
BODY MASS INDEX: 38.24 KG/M2 | SYSTOLIC BLOOD PRESSURE: 136 MMHG | TEMPERATURE: 97.8 F | DIASTOLIC BLOOD PRESSURE: 70 MMHG | WEIGHT: 298 LBS | HEIGHT: 74 IN | OXYGEN SATURATION: 95 % | HEART RATE: 61 BPM | RESPIRATION RATE: 16 BRPM

## 2024-12-03 DIAGNOSIS — F41.9 ANXIETY: Primary | ICD-10-CM

## 2024-12-03 PROCEDURE — 99213 OFFICE O/P EST LOW 20 MIN: CPT | Performed by: NURSE PRACTITIONER

## 2024-12-03 RX ORDER — ALPRAZOLAM 0.25 MG/1
0.25 TABLET ORAL 3 TIMES DAILY PRN
Qty: 30 TABLET | Refills: 1 | Status: SHIPPED | OUTPATIENT
Start: 2024-12-03 | End: 2025-01-02

## 2024-12-03 RX ORDER — BUSPIRONE HYDROCHLORIDE 30 MG/1
30 TABLET ORAL 2 TIMES DAILY
Qty: 180 TABLET | Refills: 2 | Status: SHIPPED | OUTPATIENT
Start: 2024-12-03

## 2024-12-03 RX ORDER — DULOXETIN HYDROCHLORIDE 60 MG/1
60 CAPSULE, DELAYED RELEASE ORAL 2 TIMES DAILY
Qty: 180 CAPSULE | Refills: 3 | Status: SHIPPED | OUTPATIENT
Start: 2024-12-03

## 2024-12-03 ASSESSMENT — ENCOUNTER SYMPTOMS
EYES NEGATIVE: 1
RESPIRATORY NEGATIVE: 1
GASTROINTESTINAL NEGATIVE: 1

## 2024-12-03 NOTE — PROGRESS NOTES
No cervical adenopathy.   Skin:     General: Skin is warm.   Neurological:      Mental Status: He is alert and oriented to person, place, and time.      Deep Tendon Reflexes: Reflexes are normal and symmetric.           Assessment/Plan:       Return in about 4 weeks (around 12/31/2024).    Assessment & Plan  1. Anxiety.  The patient reports overwhelming anxiety, lack of drive, and focus, which is affecting his work and causing irritability. He is currently taking BuSpar and Cymbalta. The dosage of BuSpar will be increased to 30 mg twice daily and Cymbalta to 60 mg twice daily to better manage his anxiety. A refill of Xanax 0.25 mg will be provided and sent to [facility] pharmacy for use during major social events. The patient is advised to monitor his symptoms and report any changes.    2. Depression.  The patient experiences depression triggered by anxiety, especially during social events. Increasing the dosage of BuSpar and Cymbalta is expected to help manage both anxiety and depression symptoms. He is advised to continue with the current medication regimen and report any worsening of symptoms.    Follow-up  Return in 1 month for follow up.       Diagnosis Orders   1. Anxiety  busPIRone (BUSPAR) 30 MG tablet    DULoxetine (CYMBALTA) 60 MG extended release capsule    ALPRAZolam (XANAX) 0.25 MG tablet          No orders of the defined types were placed in this encounter.    Orders Placed This Encounter   Medications    busPIRone (BUSPAR) 30 MG tablet     Sig: Take 30 mg by mouth in the morning and at bedtime     Dispense:  180 tablet     Refill:  2    DULoxetine (CYMBALTA) 60 MG extended release capsule     Sig: Take 1 capsule by mouth 2 times daily     Dispense:  180 capsule     Refill:  3    ALPRAZolam (XANAX) 0.25 MG tablet     Sig: Take 1 tablet by mouth 3 times daily as needed for Anxiety for up to 30 days. Max Daily Amount: 0.75 mg     Dispense:  30 tablet     Refill:  1      On this date 12/03/24 I have spent

## 2025-03-14 RX ORDER — VALACYCLOVIR HYDROCHLORIDE 1 G/1
2000 TABLET, FILM COATED ORAL 2 TIMES DAILY
Qty: 20 TABLET | Refills: 1 | Status: SHIPPED | OUTPATIENT
Start: 2025-03-14

## 2025-03-14 NOTE — TELEPHONE ENCOUNTER
Last visit- 12/3/2024  Next visit- Visit date not found    Requested Prescriptions     Pending Prescriptions Disp Refills    valACYclovir (VALTREX) 1 g tablet 20 tablet 1     Sig: Take 2 tablets by mouth 2 times daily

## 2025-03-18 RX ORDER — VALACYCLOVIR HYDROCHLORIDE 1 G/1
2000 TABLET, FILM COATED ORAL 2 TIMES DAILY
Qty: 60 TABLET | Refills: 1 | Status: SHIPPED | OUTPATIENT
Start: 2025-03-18 | End: 2025-03-19 | Stop reason: SDUPTHER

## 2025-03-20 RX ORDER — VALACYCLOVIR HYDROCHLORIDE 1 G/1
2000 TABLET, FILM COATED ORAL 2 TIMES DAILY
Qty: 60 TABLET | Refills: 1 | Status: SHIPPED | OUTPATIENT
Start: 2025-03-20

## 2025-05-01 DIAGNOSIS — E78.2 MIXED HYPERLIPIDEMIA: ICD-10-CM

## 2025-05-01 RX ORDER — ATORVASTATIN CALCIUM 20 MG/1
20 TABLET, FILM COATED ORAL DAILY
Qty: 90 TABLET | Refills: 1 | Status: SHIPPED | OUTPATIENT
Start: 2025-05-01

## 2025-05-07 SDOH — ECONOMIC STABILITY: TRANSPORTATION INSECURITY
IN THE PAST 12 MONTHS, HAS THE LACK OF TRANSPORTATION KEPT YOU FROM MEDICAL APPOINTMENTS OR FROM GETTING MEDICATIONS?: PATIENT DECLINED

## 2025-05-07 SDOH — ECONOMIC STABILITY: FOOD INSECURITY: WITHIN THE PAST 12 MONTHS, YOU WORRIED THAT YOUR FOOD WOULD RUN OUT BEFORE YOU GOT MONEY TO BUY MORE.: NEVER TRUE

## 2025-05-07 SDOH — ECONOMIC STABILITY: FOOD INSECURITY: WITHIN THE PAST 12 MONTHS, THE FOOD YOU BOUGHT JUST DIDN'T LAST AND YOU DIDN'T HAVE MONEY TO GET MORE.: NEVER TRUE

## 2025-05-07 SDOH — ECONOMIC STABILITY: INCOME INSECURITY: IN THE LAST 12 MONTHS, WAS THERE A TIME WHEN YOU WERE NOT ABLE TO PAY THE MORTGAGE OR RENT ON TIME?: PATIENT DECLINED

## 2025-05-07 SDOH — ECONOMIC STABILITY: TRANSPORTATION INSECURITY
IN THE PAST 12 MONTHS, HAS LACK OF TRANSPORTATION KEPT YOU FROM MEETINGS, WORK, OR FROM GETTING THINGS NEEDED FOR DAILY LIVING?: PATIENT DECLINED

## 2025-05-07 ASSESSMENT — PATIENT HEALTH QUESTIONNAIRE - PHQ9
6. FEELING BAD ABOUT YOURSELF - OR THAT YOU ARE A FAILURE OR HAVE LET YOURSELF OR YOUR FAMILY DOWN: MORE THAN HALF THE DAYS
9. THOUGHTS THAT YOU WOULD BE BETTER OFF DEAD, OR OF HURTING YOURSELF: NOT AT ALL
8. MOVING OR SPEAKING SO SLOWLY THAT OTHER PEOPLE COULD HAVE NOTICED. OR THE OPPOSITE - BEING SO FIDGETY OR RESTLESS THAT YOU HAVE BEEN MOVING AROUND A LOT MORE THAN USUAL: NOT AT ALL
7. TROUBLE CONCENTRATING ON THINGS, SUCH AS READING THE NEWSPAPER OR WATCHING TELEVISION: NEARLY EVERY DAY
4. FEELING TIRED OR HAVING LITTLE ENERGY: NEARLY EVERY DAY
4. FEELING TIRED OR HAVING LITTLE ENERGY: NEARLY EVERY DAY
2. FEELING DOWN, DEPRESSED OR HOPELESS: MORE THAN HALF THE DAYS
8. MOVING OR SPEAKING SO SLOWLY THAT OTHER PEOPLE COULD HAVE NOTICED. OR THE OPPOSITE, BEING SO FIGETY OR RESTLESS THAT YOU HAVE BEEN MOVING AROUND A LOT MORE THAN USUAL: NOT AT ALL
SUM OF ALL RESPONSES TO PHQ QUESTIONS 1-9: 19
10. IF YOU CHECKED OFF ANY PROBLEMS, HOW DIFFICULT HAVE THESE PROBLEMS MADE IT FOR YOU TO DO YOUR WORK, TAKE CARE OF THINGS AT HOME, OR GET ALONG WITH OTHER PEOPLE: SOMEWHAT DIFFICULT
9. THOUGHTS THAT YOU WOULD BE BETTER OFF DEAD, OR OF HURTING YOURSELF: NOT AT ALL
3. TROUBLE FALLING OR STAYING ASLEEP: NEARLY EVERY DAY
7. TROUBLE CONCENTRATING ON THINGS, SUCH AS READING THE NEWSPAPER OR WATCHING TELEVISION: NEARLY EVERY DAY
1. LITTLE INTEREST OR PLEASURE IN DOING THINGS: NEARLY EVERY DAY
10. IF YOU CHECKED OFF ANY PROBLEMS, HOW DIFFICULT HAVE THESE PROBLEMS MADE IT FOR YOU TO DO YOUR WORK, TAKE CARE OF THINGS AT HOME, OR GET ALONG WITH OTHER PEOPLE: SOMEWHAT DIFFICULT
1. LITTLE INTEREST OR PLEASURE IN DOING THINGS: NEARLY EVERY DAY
SUM OF ALL RESPONSES TO PHQ QUESTIONS 1-9: 19
SUM OF ALL RESPONSES TO PHQ QUESTIONS 1-9: 19
5. POOR APPETITE OR OVEREATING: NEARLY EVERY DAY
SUM OF ALL RESPONSES TO PHQ QUESTIONS 1-9: 19
5. POOR APPETITE OR OVEREATING: NEARLY EVERY DAY
SUM OF ALL RESPONSES TO PHQ QUESTIONS 1-9: 19
3. TROUBLE FALLING OR STAYING ASLEEP: NEARLY EVERY DAY
2. FEELING DOWN, DEPRESSED OR HOPELESS: MORE THAN HALF THE DAYS
6. FEELING BAD ABOUT YOURSELF - OR THAT YOU ARE A FAILURE OR HAVE LET YOURSELF OR YOUR FAMILY DOWN: MORE THAN HALF THE DAYS

## 2025-05-09 ENCOUNTER — OFFICE VISIT (OUTPATIENT)
Dept: FAMILY MEDICINE CLINIC | Age: 45
End: 2025-05-09

## 2025-05-09 VITALS
HEART RATE: 60 BPM | BODY MASS INDEX: 39.78 KG/M2 | TEMPERATURE: 98.2 F | SYSTOLIC BLOOD PRESSURE: 150 MMHG | RESPIRATION RATE: 16 BRPM | OXYGEN SATURATION: 96 % | WEIGHT: 310 LBS | HEIGHT: 74 IN | DIASTOLIC BLOOD PRESSURE: 72 MMHG

## 2025-05-09 DIAGNOSIS — M30.1 PULMONARY DISEASE DUE TO EOSINOPHILIC GRANULOMATOSIS WITH POLYANGIITIS (EGPA) (HCC): Primary | ICD-10-CM

## 2025-05-09 DIAGNOSIS — E66.01 CLASS 2 SEVERE OBESITY WITH SERIOUS COMORBIDITY AND BODY MASS INDEX (BMI) OF 39.0 TO 39.9 IN ADULT, UNSPECIFIED OBESITY TYPE (HCC): ICD-10-CM

## 2025-05-09 DIAGNOSIS — E66.812 CLASS 2 SEVERE OBESITY WITH SERIOUS COMORBIDITY AND BODY MASS INDEX (BMI) OF 39.0 TO 39.9 IN ADULT, UNSPECIFIED OBESITY TYPE (HCC): ICD-10-CM

## 2025-05-09 ASSESSMENT — ENCOUNTER SYMPTOMS
RESPIRATORY NEGATIVE: 1
EYES NEGATIVE: 1
GASTROINTESTINAL NEGATIVE: 1

## 2025-05-09 NOTE — PROGRESS NOTES
Anthony Mcdaniel is a 45 y.o. male who presents today for :  Chief Complaint   Patient presents with    Weight Loss     Vitals:    05/09/25 0845   BP: (!) 150/72   Pulse: 60   Resp: 16   Temp: 98.2 °F (36.8 °C)   SpO2: 96%       HPI:     History of Present Illness  The patient presents for evaluation of weight loss.    He has expressed interest in initiating Wegovy treatment, following the positive experience of his partner. He acknowledges the necessity of maintaining an active lifestyle during this treatment. His decision to pursue this course of action was influenced by a recommendation from a specialist in [place], who suggested that Wegovy could potentially enhance his respiratory function. He is curious about the potential impact of Wegovy on his appetite, which he perceives as excessive. He reports frequent hunger, a situation exacerbated by his current remote work setup, which involves prolonged periods of sitting at home. Despite having ample household tasks to engage in, he struggles with motivation and energy levels. He is not diabetic.         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)    Abdominal wall mass     Past Medical History:   Diagnosis Date    Allergic rhinitis     Anxiety     Chronic rhinosinusitis     Depression     Hypercholesteremia     Sleep apnea     Vocal cord dysfunction       Past Surgical History:   Procedure Laterality Date    ABDOMEN SURGERY N/A 12/20/2022    EXCISION ABDOMINAL WALL SOFT TISS:UE LIPOMA performed by Skylar Griffin MD at [facility] OR    SINUS SURGERY  12/01/2016    SINUS SURGERY  2021    [place]     Family History   Problem Relation Age of Onset    No Known Problems Mother     No Known Problems Father     No Known Problems Sister     No Known Problems Sister      Social History     Tobacco Use    Smoking status: Former

## 2025-05-15 ENCOUNTER — TELEPHONE (OUTPATIENT)
Dept: FAMILY MEDICINE CLINIC | Age: 45
End: 2025-05-15

## 2025-05-15 NOTE — TELEPHONE ENCOUNTER
[employer]    Request for additional information received by insurance. Form was competed and notes were faxed to [phone].

## 2025-06-06 ENCOUNTER — PATIENT MESSAGE (OUTPATIENT)
Dept: FAMILY MEDICINE CLINIC | Age: 45
End: 2025-06-06

## 2025-06-23 RX ORDER — VALACYCLOVIR HYDROCHLORIDE 1 G/1
2000 TABLET, FILM COATED ORAL 2 TIMES DAILY
Qty: 60 TABLET | Refills: 1 | Status: SHIPPED | OUTPATIENT
Start: 2025-06-23

## 2025-07-07 ENCOUNTER — PATIENT MESSAGE (OUTPATIENT)
Dept: FAMILY MEDICINE CLINIC | Age: 45
End: 2025-07-07

## 2025-07-24 RX ORDER — VALACYCLOVIR HYDROCHLORIDE 1 G/1
2000 TABLET, FILM COATED ORAL 2 TIMES DAILY
Qty: 60 TABLET | Refills: 1 | Status: SHIPPED | OUTPATIENT
Start: 2025-07-24

## 2025-08-04 ENCOUNTER — PATIENT MESSAGE (OUTPATIENT)
Dept: FAMILY MEDICINE CLINIC | Age: 45
End: 2025-08-04

## 2025-08-26 RX ORDER — OMEPRAZOLE 40 MG/1
40 CAPSULE, DELAYED RELEASE ORAL DAILY
Qty: 90 CAPSULE | Refills: 1 | Status: SHIPPED | OUTPATIENT
Start: 2025-08-26

## 2025-09-03 RX ORDER — VALACYCLOVIR HYDROCHLORIDE 1 G/1
2000 TABLET, FILM COATED ORAL 2 TIMES DAILY
Qty: 60 TABLET | Refills: 1 | Status: SHIPPED | OUTPATIENT
Start: 2025-09-03

## (undated) DEVICE — PENCIL SMK EVAC ALL IN 1 DSGN ENH VISIBILITY IMPROVED AIR

## (undated) DEVICE — SUTURE VCRL + SZ 4-0 L27IN ABSRB WHT FS-2 3/8 CIR REV CUT VCP422H

## (undated) DEVICE — SPONGE GZ W4XL4IN COT 12 PLY TYP VII WVN C FLD DSGN

## (undated) DEVICE — GLOVE SURG SZ 7 L12IN FNGR THK94MIL TRNSLUC YEL LTX HYDRGEL

## (undated) DEVICE — BREAST HERNIA PACK: Brand: MEDLINE INDUSTRIES, INC.